# Patient Record
Sex: FEMALE | Race: WHITE | NOT HISPANIC OR LATINO | Employment: UNEMPLOYED | URBAN - NONMETROPOLITAN AREA
[De-identification: names, ages, dates, MRNs, and addresses within clinical notes are randomized per-mention and may not be internally consistent; named-entity substitution may affect disease eponyms.]

---

## 2017-03-17 ENCOUNTER — HOSPITAL ENCOUNTER (OUTPATIENT)
Dept: LAB | Facility: MEDICAL CENTER | Age: 42
End: 2017-03-17
Attending: PHYSICIAN ASSISTANT
Payer: COMMERCIAL

## 2017-03-17 LAB
T4 FREE SERPL-MCNC: 1.06 NG/DL (ref 0.53–1.43)
TSH SERPL DL<=0.005 MIU/L-ACNC: 1.11 UIU/ML (ref 0.3–3.7)

## 2017-03-17 PROCEDURE — 84439 ASSAY OF FREE THYROXINE: CPT

## 2017-03-17 PROCEDURE — 36415 COLL VENOUS BLD VENIPUNCTURE: CPT

## 2017-03-17 PROCEDURE — 84443 ASSAY THYROID STIM HORMONE: CPT

## 2017-04-18 ENCOUNTER — OFFICE VISIT (OUTPATIENT)
Dept: ENDOCRINOLOGY | Facility: MEDICAL CENTER | Age: 42
End: 2017-04-18
Payer: COMMERCIAL

## 2017-04-18 VITALS
WEIGHT: 210 LBS | HEART RATE: 87 BPM | BODY MASS INDEX: 38.4 KG/M2 | SYSTOLIC BLOOD PRESSURE: 132 MMHG | DIASTOLIC BLOOD PRESSURE: 84 MMHG | OXYGEN SATURATION: 95 %

## 2017-04-18 DIAGNOSIS — E03.9 ACQUIRED HYPOTHYROIDISM: ICD-10-CM

## 2017-04-18 PROCEDURE — 99203 OFFICE O/P NEW LOW 30 MIN: CPT | Performed by: INTERNAL MEDICINE

## 2017-04-18 NOTE — PROGRESS NOTES
New Patient Consult Note  Primary care physician: Shawn Joshua D.O.    Reason for consult: Hypothyroidism    HPI:  Lisa Bonilla is a 41 y.o. old patient who comes in today for evaluation of hypothyroidism. She was diagnosed with hypothyroidism over 5 years ago. She is currently on levofloxacin 75 mg daily. She complains of palpitations, especially waking up in the morning. Also has difficulty losing weight. She feels tired. No family history of thyroid issues.    ROS:  Constitutional: Positive for weight gain  Cardiac: Positive for palpitations  All other systems were reviewed and were negative.    Past Medical History:  Patient Active Problem List    Diagnosis Date Noted   • Daytime sleepiness 09/13/2016       Past Surgical History:  Past Surgical History   Procedure Laterality Date   • Carpal tunnel release         Allergies:  Pertussin    Social History:  Social History     Social History   • Marital Status:      Spouse Name: N/A   • Number of Children: N/A   • Years of Education: N/A     Occupational History   • Not on file.     Social History Main Topics   • Smoking status: Former Smoker -- 0.25 packs/day for 9 years     Types: Cigarettes     Quit date: 01/01/1999   • Smokeless tobacco: Never Used      Comment: Social smoker only   • Alcohol Use: 0.0 - 1.2 oz/week     0-1 Glasses of wine, 0-1 Cans of beer per week      Comment: Occasionally   • Drug Use: No   • Sexual Activity: Not on file     Other Topics Concern   • Not on file     Social History Narrative       Family History:  Family History   Problem Relation Age of Onset   • Diabetes Mother    • Sleep Apnea Father    • Diabetes Father    • Heart Disease Father    • Sleep Apnea Brother    • Heart Disease Maternal Grandmother    • Heart Disease Maternal Grandfather    • Heart Disease Paternal Grandmother    • Heart Disease Paternal Grandfather        Medications:    Current outpatient prescriptions:   •  levothyroxine (SYNTHROID) 75 MCG Tab,  Take 75 mcg by mouth every day. 1 tab po daily x 5 days each week, Disp: , Rfl: 0  •  Multiple Vitamin (MULTI VITAMIN DAILY) Tab, Take  by mouth., Disp: , Rfl:   •  vitamin D (CHOLECALCIFEROL) 1000 UNIT Tab, Take 1,000 Units by mouth every day., Disp: , Rfl:   •  Magnesium 500 MG Cap, Take  by mouth., Disp: , Rfl:   •  B Complex-Folic Acid (B COMPLEX FORMULA 1) Tab, Take  by mouth., Disp: , Rfl:   •  Omega-3 Fatty Acids (FISH OIL) 1000 MG Cap capsule, Take 1,000 mg by mouth 3 times a day, with meals., Disp: , Rfl:   •  zolpidem (AMBIEN) 5 MG Tab, Take 1-2 tablets by mouth every evening as needed for insomnia. Bring to sleep study., Disp: 3 Tab, Rfl: 0    Labs:  Results for GERRI PIERRE (MRN [id number]) as of 4/18/2017 12:52   Ref. Range 7/27/2016 09:00 3/17/2017 15:23   TSH Latest Ref Range: 0.300-3.700 uIU/mL 0.810 1.110   Free T-4 Latest Ref Range: 0.53-1.43 ng/dL 1.28 1.06     Physical Examination:  Vital signs: /84 mmHg  General: No apparent distress, cooperative  Eyes: No scleral icterus or discharge  ENMT: Normal on external inspection of nose, lips  Neck: No abnormal masses on inspection  Resp: Normal effort  Neuro: Alert and oriented  Skin: No rash  Psych: Normal mood and affect    Assessment and Plan:    1. Acquired hypothyroidism  · We discussed pituitary thyroid axis and its regulation, we discussed about diagnosis and treatment of hypothyroidism  · She has some symptoms such as palpitations and inability to lose weight which she thinks are related to her thyroid, her most recent TSH is 1.1, I tried to reassure her that she is adequately replaced with levothyroxine and the TSH is completely normal that her symptoms are not related to the thyroid; we will repeat labs in 6 weeks, if she continues to feel poorly we will try [place] Thyroid  - TSH; Future  - FREE THYROXINE; Future  - TSH; Future  - FREE THYROXINE; Future    Return if symptoms worsen or fail to improve.    Thank you for allowing me  to participate in the care of this patient.    Radha Thomas M.D.  04/18/2017    CC:   Shawn Joshua D.O.    This note was created using voice recognition software (Dragon). The accuracy of the dictation is limited by the abilities of the software. I have reviewed the note prior to signing, however some errors in grammar and context are still possible. If you have any questions related to this note please do not hesitate to contact our office.

## 2017-04-18 NOTE — MR AVS SNAPSHOT
Lisa Bonilla   2017 12:00 PM   Office Visit   MRN: [id number]    Department:  Endocrinology [facility]   Dept Phone:  [phone]    Description:  Female : 1975   Provider:  Radha Thomas M.D.           Reason for Visit     New Patient Thyroid      Allergies as of 2017     Allergen Noted Reactions    Pertussin [Cheracol-D Cough] 2016   Shortness of Breath    Pertussis Vaccine      You were diagnosed with     Acquired hypothyroidism   [3716541]         Vital Signs     Blood Pressure Pulse Weight Oxygen Saturation Smoking Status       132/84 mmHg 87 95.255 kg (210 lb) 95% Former Smoker       Basic Information     Date Of Birth Sex Race Ethnicity Preferred Language    1975 Female White Non- English      Problem List              ICD-10-CM Priority Class Noted - Resolved    Daytime sleepiness R40.0   2016 - Present      Health Maintenance        Date Due Completion Dates    IMM DTaP/Tdap/Td Vaccine (1 - Tdap) 1993 ---    PAP SMEAR 1995 ---    MAMMOGRAM 2014 ---            Current Immunizations     No immunizations on file.      Below and/or attached are the medications your provider expects you to take. Review all of your home medications and newly ordered medications with your provider and/or pharmacist. Follow medication instructions as directed by your provider and/or pharmacist. Please keep your medication list with you and share with your provider. Update the information when medications are discontinued, doses are changed, or new medications (including over-the-counter products) are added; and carry medication information at all times in the event of emergency situations     Allergies:  PERTUSSIN - Shortness of Breath               Medications  Valid as of: 2017 -  1:40 PM    Generic Name Brand Name Tablet Size Instructions for use    B Complex-Folic Acid (Tab) B COMPLEX FORMULA 1  Take  by mouth.        Cholecalciferol (Tab)  cholecalciferol 1000 UNIT Take 1,000 Units by mouth every day.        Levothyroxine Sodium (Tab) SYNTHROID 75 MCG Take 75 mcg by mouth every day. 1 tab po daily x 5 days each week        Magnesium Oxide (Cap) Magnesium 500 MG Take  by mouth.        Multiple Vitamin (Tab) MULTI VITAMIN DAILY  Take  by mouth.        Omega-3 Fatty Acids (Cap) fish oil 1000 MG Take 1,000 mg by mouth 3 times a day, with meals.        Zolpidem Tartrate (Tab) AMBIEN 5 MG Take 1-2 tablets by mouth every evening as needed for insomnia. Bring to sleep study.        .                 Medicines prescribed today were sent to:     [id number] - [place], [place] [address]    [address] [place] [address]    Phone: [phone] Fax: [phone]    Open 24 Hours?: No      Medication refill instructions:       If your prescription bottle indicates you have medication refills left, it is not necessary to call your provider’s office. Please contact your pharmacy and they will refill your medication.    If your prescription bottle indicates you do not have any refills left, you may request refills at any time through one of the following ways: The online Hobzy system (except Urgent Care), by calling your provider’s office, or by asking your pharmacy to contact your provider’s office with a refill request. Medication refills are processed only during regular business hours and may not be available until the next business day. Your provider may request additional information or to have a follow-up visit with you prior to refilling your medication.   *Please Note: Medication refills are assigned a new Rx number when refilled electronically. Your pharmacy may indicate that no refills were authorized even though a new prescription for the same medication is available at the pharmacy. Please request the medicine by name with the pharmacy before contacting your provider for a refill.        Your To Do List     Future Labs/Procedures Complete  By Expires    FREE THYROXINE  As directed 4/18/2018    FREE THYROXINE  As directed 4/18/2018    TSH  As directed 4/18/2018    TSH  As directed 4/18/2018         Citiz[employer]or Access Code: [email or web address]  Expires: 5/18/2017  1:40 PM    [employer]x  A secure, online tool to manage your health information     [facility]’s Citizinvestor® is a secure, online tool that connects you to your personalized health information from the privacy of your home -- day or night - making it very easy for you to manage your healthcare. Once the activation process is completed, you can even access your medical information using the Citizinvestor love, which is available for free in the Apple Love store or Google Play store.     Citiz[employer]or provides the following levels of access (as shown below):   My Chart Features   [facility] Doctor [facility]  Specialists [facility]  Urgent  Care Non-[facility]  Primary Care  Doctor   Email your healthcare team securely and privately 24/7 X X X    Manage appointments: schedule your next appointment; view details of past/upcoming appointments X      Request prescription refills. X      View recent personal medical records, including lab and immunizations X X X X   View health record, including health history, allergies, medications X X X X   Read reports about your outpatient visits, procedures, consult and ER notes X X X X   See your discharge summary, which is a recap of your hospital and/or ER visit that includes your diagnosis, lab results, and care plan. X X       How to register for Citizinvestor:  1. Go to  [email or web address].  2. Click on the Sign Up Now box, which takes you to the New Member Sign Up page. You will need to provide the following information:  a. Enter your Citizinvestor Access Code exactly as it appears at the top of this page. (You will not need to use this code after you’ve completed the sign-up process. If you do not sign up before the expiration date, you must request a new code.)   b. Enter your date  of birth.   c. Enter your home email address.   d. Click Submit, and follow the next screen’s instructions.  3. Create a Solar Roadways ID. This will be your Solar Roadways login ID and cannot be changed, so think of one that is secure and easy to remember.  4. Create a [place] password. You can change your password at any time.  5. Enter your Password Reset Question and Answer. This can be used at a later time if you forget your password.   6. Enter your e-mail address. This allows you to receive e-mail notifications when new information is available in Solar Roadways.  7. Click Sign Up. You can now view your health information.    For assistance activating your [employer] account, call [phone]

## 2018-05-31 ENCOUNTER — OFFICE VISIT (OUTPATIENT)
Dept: MEDICAL GROUP | Facility: MEDICAL CENTER | Age: 43
End: 2018-05-31
Payer: COMMERCIAL

## 2018-05-31 VITALS
DIASTOLIC BLOOD PRESSURE: 84 MMHG | HEIGHT: 63 IN | BODY MASS INDEX: 37.58 KG/M2 | TEMPERATURE: 100 F | SYSTOLIC BLOOD PRESSURE: 112 MMHG | OXYGEN SATURATION: 97 % | HEART RATE: 85 BPM | RESPIRATION RATE: 16 BRPM | WEIGHT: 212.08 LBS

## 2018-05-31 DIAGNOSIS — Z13.6 SCREENING FOR ISCHEMIC HEART DISEASE: ICD-10-CM

## 2018-05-31 DIAGNOSIS — E03.9 HYPOTHYROIDISM, UNSPECIFIED TYPE: ICD-10-CM

## 2018-05-31 DIAGNOSIS — R00.2 PALPITATION: ICD-10-CM

## 2018-05-31 DIAGNOSIS — Z13.1 SCREENING FOR DIABETES MELLITUS: ICD-10-CM

## 2018-05-31 PROBLEM — G47.00 INSOMNIA: Status: RESOLVED | Noted: 2018-05-31 | Resolved: 2018-05-31

## 2018-05-31 PROBLEM — G47.00 INSOMNIA: Status: ACTIVE | Noted: 2018-05-31

## 2018-05-31 PROCEDURE — 99204 OFFICE O/P NEW MOD 45 MIN: CPT | Performed by: FAMILY MEDICINE

## 2018-05-31 RX ORDER — LEVOTHYROXINE SODIUM 0.07 MG/1
75 TABLET ORAL DAILY
Qty: 90 TAB | Refills: 0 | Status: SHIPPED | OUTPATIENT
Start: 2018-05-31 | End: 2018-08-09 | Stop reason: SDUPTHER

## 2018-05-31 NOTE — PROGRESS NOTES
[facility]  Progress Note  New Patient    Subjective:   Lisa Bonilla is a 43 y.o. female here today with a chief complaint of palpitations. This is a new patient to me. The patient comes in alone.     Palpitation  The patient states that over a year ago she started waking up in the middle the night with sweating, palpitations and feeling hot. Ultimately, this went away. Over the past few months, however, she has noticed it occurring again and is moderate in intensity. She denies chest pain but does endorse some occasional shortness of breath. The patient states that her periods are less consistent than usual, though they are still occurring. There are no known alleviating or exacerbating factors.     Hypothyroidism  The patient has hypothyroidism and is taking Synthroid 75 mcg daily. She tolerates this medication well.       Current Outpatient Prescriptions on File Prior to Visit   Medication Sig Dispense Refill   • Multiple Vitamin (MULTI VITAMIN DAILY) Tab Take  by mouth.     • vitamin D (CHOLECALCIFEROL) 1000 UNIT Tab Take 1,000 Units by mouth every day.     • Magnesium 500 MG Cap Take  by mouth.     • B Complex-Folic Acid (B COMPLEX FORMULA 1) Tab Take  by mouth.     • Omega-3 Fatty Acids (FISH OIL) 1000 MG Cap capsule Take 1,000 mg by mouth 3 times a day, with meals.       No current facility-administered medications on file prior to visit.        Past Medical History:   Diagnosis Date   • Depression    • Hypertension    • Hypothyroidism        Allergies: Pertussis vaccine    Surgical History:  has a past surgical history that includes carpal tunnel release.    Family History: family history includes Diabetes in her father and mother; Heart Disease in her father, maternal grandfather, maternal grandmother, paternal grandfather, and paternal grandmother; Hyperlipidemia in her father and mother; Hypertension in her father; Psychiatry in her father and mother; Sleep Apnea in her brother and  "father.    Social History:  reports that she quit smoking about 19 years ago. Her smoking use included Cigarettes. She has a 2.25 pack-year smoking history. She has never used smokeless tobacco. She reports that she drinks alcohol. She reports that she does not use drugs.    ROS:   Constitutional ROS: no fevers, chills.   Eye ROS: No eye pain, redness, discharge  Ear ROS: No ear pain  Mouth/Throat ROS: No bleeding gums  Neck ROS: some occasional glandular swelling in neck.   Pulmonary ROS: No shortness of breath during day, sometimes at night.   Cardiovascular ROS: No chest pain  Gastrointestinal ROS: No abdominal pain  Musculoskeletal/Extremities ROS: some foot pain in morning.   Hematologic/Lymphatic ROS: No abnormal bleeding  Skin/Integumentary ROS: No evidence of rash  Neurologic ROS: No seizures  Psychiatric ROS: has h/o depression. No SI.        Objective:     Vitals:    05/31/18 1059   BP: 112/84   Pulse: 85   Resp: 16   Temp: 37.8 °C (100 °F)   SpO2: 97%   Weight: 96.2 kg (212 lb 1.3 oz)   Height: 1.6 m (5' 3\")       Physical Exam:  Constitutional: Alert, no distress.  Skin: Warm, dry, good turgor, no rashes in visible areas.  Eye: Equal, conjunctiva clear, lids normal.  ENMT: Lips without lesions, good dentition, oropharynx clear.  Neck: Trachea midline, no masses, no thyromegaly. No cervical or supraclavicular lymphadenopathy  Respiratory: Unlabored respiratory effort, lungs clear to auscultation, no wheezes, no ronchi.  Cardiovascular: Normal S1, S2, no murmur, no edema.  Abdomen: Soft, non-tender, no masses, no hepatosplenomegaly.  Psych: Alert and oriented, normal affect and mood.        Assessment and Plan:     1. Screening for ischemic heart disease  - LIPID PROFILE; Future    2. Screening for diabetes mellitus  - TSH; Future    3. Hypothyroidism, unspecified type  - continue Synthroid, obtain TSH.     4. Palpitation  Exam and vitals normal. With subjective early morning wakening and metrorhaggia. " Also on Synthroid. Will r/o arrhythmia and over-repletion of thyroid, though likely all these sx are due to perimenopause and we can then consider treatment options.   - COMP METABOLIC PANEL; Future  - TSH.   - HOLTER - Cardiology Performed (48HR); Future        Followup: Return in about 2 months (around 7/31/2018), or if symptoms worsen or fail to improve.

## 2018-05-31 NOTE — ASSESSMENT & PLAN NOTE
The patient has hypothyroidism and is taking Synthroid 75 mcg daily. She tolerates this medication well.

## 2018-06-22 ENCOUNTER — NON-PROVIDER VISIT (OUTPATIENT)
Dept: CARDIOLOGY | Facility: MEDICAL CENTER | Age: 43
End: 2018-06-22
Payer: COMMERCIAL

## 2018-06-22 DIAGNOSIS — R00.2 PALPITATION: ICD-10-CM

## 2018-06-27 LAB — EKG IMPRESSION: NORMAL

## 2018-06-27 PROCEDURE — 93224 XTRNL ECG REC UP TO 48 HRS: CPT | Performed by: INTERNAL MEDICINE

## 2018-07-17 ENCOUNTER — HOSPITAL ENCOUNTER (OUTPATIENT)
Dept: LAB | Facility: MEDICAL CENTER | Age: 43
End: 2018-07-17
Attending: FAMILY MEDICINE
Payer: COMMERCIAL

## 2018-07-17 DIAGNOSIS — R00.2 PALPITATION: ICD-10-CM

## 2018-07-17 DIAGNOSIS — Z13.1 SCREENING FOR DIABETES MELLITUS: ICD-10-CM

## 2018-07-17 DIAGNOSIS — Z13.6 SCREENING FOR ISCHEMIC HEART DISEASE: ICD-10-CM

## 2018-07-17 LAB — TSH SERPL DL<=0.005 MIU/L-ACNC: 0.63 UIU/ML (ref 0.38–5.33)

## 2018-07-17 PROCEDURE — 84443 ASSAY THYROID STIM HORMONE: CPT

## 2018-07-17 PROCEDURE — 80053 COMPREHEN METABOLIC PANEL: CPT

## 2018-07-17 PROCEDURE — 36415 COLL VENOUS BLD VENIPUNCTURE: CPT

## 2018-07-17 PROCEDURE — 80061 LIPID PANEL: CPT

## 2018-07-18 LAB
ALBUMIN SERPL BCP-MCNC: 4 G/DL (ref 3.2–4.9)
ALBUMIN/GLOB SERPL: 1.4 G/DL
ALP SERPL-CCNC: 74 U/L (ref 30–99)
ALT SERPL-CCNC: 16 U/L (ref 2–50)
ANION GAP SERPL CALC-SCNC: 5 MMOL/L (ref 0–11.9)
AST SERPL-CCNC: 14 U/L (ref 12–45)
BILIRUB SERPL-MCNC: 0.5 MG/DL (ref 0.1–1.5)
BUN SERPL-MCNC: 13 MG/DL (ref 8–22)
CALCIUM SERPL-MCNC: 9.4 MG/DL (ref 8.5–10.5)
CHLORIDE SERPL-SCNC: 105 MMOL/L (ref 96–112)
CHOLEST SERPL-MCNC: 148 MG/DL (ref 100–199)
CO2 SERPL-SCNC: 27 MMOL/L (ref 20–33)
CREAT SERPL-MCNC: 0.81 MG/DL (ref 0.5–1.4)
GLOBULIN SER CALC-MCNC: 2.9 G/DL (ref 1.9–3.5)
GLUCOSE SERPL-MCNC: 123 MG/DL (ref 65–99)
HDLC SERPL-MCNC: 33 MG/DL
LDLC SERPL CALC-MCNC: 102 MG/DL
POTASSIUM SERPL-SCNC: 4.3 MMOL/L (ref 3.6–5.5)
PROT SERPL-MCNC: 6.9 G/DL (ref 6–8.2)
SODIUM SERPL-SCNC: 137 MMOL/L (ref 135–145)
TRIGL SERPL-MCNC: 66 MG/DL (ref 0–149)

## 2018-08-09 ENCOUNTER — OFFICE VISIT (OUTPATIENT)
Dept: MEDICAL GROUP | Facility: MEDICAL CENTER | Age: 43
End: 2018-08-09
Payer: COMMERCIAL

## 2018-08-09 ENCOUNTER — HOSPITAL ENCOUNTER (OUTPATIENT)
Dept: RADIOLOGY | Facility: MEDICAL CENTER | Age: 43
End: 2018-08-09
Attending: FAMILY MEDICINE
Payer: COMMERCIAL

## 2018-08-09 VITALS
SYSTOLIC BLOOD PRESSURE: 128 MMHG | BODY MASS INDEX: 38.2 KG/M2 | DIASTOLIC BLOOD PRESSURE: 88 MMHG | RESPIRATION RATE: 20 BRPM | WEIGHT: 215.61 LBS | TEMPERATURE: 97.5 F | HEIGHT: 63 IN | HEART RATE: 80 BPM | OXYGEN SATURATION: 97 %

## 2018-08-09 DIAGNOSIS — M25.572 CHRONIC PAIN OF LEFT ANKLE: ICD-10-CM

## 2018-08-09 DIAGNOSIS — F32.1 CURRENT MODERATE EPISODE OF MAJOR DEPRESSIVE DISORDER WITHOUT PRIOR EPISODE (HCC): ICD-10-CM

## 2018-08-09 DIAGNOSIS — E03.9 HYPOTHYROIDISM, UNSPECIFIED TYPE: ICD-10-CM

## 2018-08-09 DIAGNOSIS — N95.1 PERIMENOPAUSE: ICD-10-CM

## 2018-08-09 DIAGNOSIS — G89.29 CHRONIC PAIN OF LEFT ANKLE: ICD-10-CM

## 2018-08-09 DIAGNOSIS — E78.5 DYSLIPIDEMIA: ICD-10-CM

## 2018-08-09 DIAGNOSIS — Z23 NEED FOR VACCINATION: ICD-10-CM

## 2018-08-09 DIAGNOSIS — R73.01 IMPAIRED FASTING GLUCOSE: ICD-10-CM

## 2018-08-09 DIAGNOSIS — G47.00 INSOMNIA, UNSPECIFIED TYPE: ICD-10-CM

## 2018-08-09 PROBLEM — F32.9 MAJOR DEPRESSIVE DISORDER: Status: ACTIVE | Noted: 2018-08-09

## 2018-08-09 PROCEDURE — 73610 X-RAY EXAM OF ANKLE: CPT | Mod: LT

## 2018-08-09 PROCEDURE — 99214 OFFICE O/P EST MOD 30 MIN: CPT | Performed by: FAMILY MEDICINE

## 2018-08-09 PROCEDURE — 73630 X-RAY EXAM OF FOOT: CPT | Mod: LT

## 2018-08-09 RX ORDER — HYDROXYZINE HYDROCHLORIDE 25 MG/1
25 TABLET, FILM COATED ORAL NIGHTLY PRN
Qty: 30 TAB | Refills: 0 | Status: SHIPPED | OUTPATIENT
Start: 2018-08-09 | End: 2018-09-04

## 2018-08-09 ASSESSMENT — PATIENT HEALTH QUESTIONNAIRE - PHQ9
SUM OF ALL RESPONSES TO PHQ QUESTIONS 1-9: 14
5. POOR APPETITE OR OVEREATING: 0 - NOT AT ALL
CLINICAL INTERPRETATION OF PHQ2 SCORE: 4

## 2018-08-09 NOTE — ASSESSMENT & PLAN NOTE
At the last visit the patient described sweating, palpitations and feeling hot without chest pain but does endorse some occasional shortness of breath and palpitations. Holter monitor is, overall, reassuring. Labs are reassuring. Her palpitations are improved.

## 2018-08-09 NOTE — ASSESSMENT & PLAN NOTE
Patient describes a history of depression with recent worsening due to some life stressors so stated with finding a job and family illness. Patient also describes some anxiety. This makes it difficult for her to stay asleep.

## 2018-08-09 NOTE — LETTER
[facility]  Huang Marshall M.D.  [address]  [place] [address]  Fax: [phone]   Authorization for Release/Disclosure of   Protected Health Information   Name: LISA PIERRE : 1975 SSN: [id number]   Address:  [place] [address] Phone:    [phone] (home)    I authorize the entity listed below to release/disclose the PHI below to:   [facility]/Huang Marshall M.D. and Huang Marshall M.D.   Provider or Entity Name:     Address   City, State, Zip   Phone:      Fax:     Reason for request: continuity of care   Information to be released:    [  ] LAST COLONOSCOPY,  including any PATH REPORT and follow-up  [  ] LAST FIT/COLOGUARD RESULT [  ] LAST DEXA  [  ] LAST MAMMOGRAM  [  ] LAST PAP  [  ] LAST LABS [  ] RETINA EXAM REPORT  [  ] IMMUNIZATION RECORDS  [  ] Release all info      [  ] Check here and initial the line next to each item to release ALL health information INCLUDING  _____ Care and treatment for drug and / or alcohol abuse  _____ HIV testing, infection status, or AIDS  _____ Genetic Testing    DATES OF SERVICE OR TIME PERIOD TO BE DISCLOSED: _____________  I understand and acknowledge that:  * This Authorization may be revoked at any time by you in writing, except if your health information has already been used or disclosed.  * Your health information that will be used or disclosed as a result of you signing this authorization could be re-disclosed by the recipient. If this occurs, your re-disclosed health information may no longer be protected by State or Federal laws.  * You may refuse to sign this Authorization. Your refusal will not affect your ability to obtain treatment.  * This Authorization becomes effective upon signing and will  on (date) __________.      If no date is indicated, this Authorization will  one (1) year from the signature date.    Name: Lisa Pierre    Signature:   Date:     2018            PLEASE FAX REQUESTED RECORDS BACK TO: [phone]

## 2018-08-09 NOTE — PROGRESS NOTES
[facility]  Progress Note  Established Patient    Subjective:   Lisa Bonilla is a 43 y.o. female here today with a chief complaint of ankle pain. The patient is alone.     Chronic pain of left ankle  A few months ago the patient twisted her ankle, since then she has had swelling and mild severity pain worse with certain activities. This pain is intermittent. There is some associated swelling.    Dyslipidemia  Noted on past labs.     Hypothyroidism  Well-controlled on Synthroid.    Impaired fasting glucose  Noted on past labs.     Insomnia  See discussion regarding depression.     Major depressive disorder  Patient describes a history of depression with recent worsening due to some life stressors so stated with finding a job and family illness. Patient also describes some anxiety. This makes it difficult for her to stay asleep.    Perimenopause  At the last visit the patient described sweating, palpitations and feeling hot without chest pain but does endorse some occasional shortness of breath and palpitations. Holter monitor is, overall, reassuring. Labs are reassuring. Her palpitations are improved.      Current Outpatient Prescriptions on File Prior to Visit   Medication Sig Dispense Refill   • levothyroxine (SYNTHROID) 75 MCG Tab Take 1 Tab by mouth every day. 90 Tab 0   • Multiple Vitamin (MULTI VITAMIN DAILY) Tab Take  by mouth.     • vitamin D (CHOLECALCIFEROL) 1000 UNIT Tab Take 1,000 Units by mouth every day.     • Magnesium 500 MG Cap Take  by mouth.     • B Complex-Folic Acid (B COMPLEX FORMULA 1) Tab Take  by mouth.     • Omega-3 Fatty Acids (FISH OIL) 1000 MG Cap capsule Take 1,000 mg by mouth 3 times a day, with meals.       No current facility-administered medications on file prior to visit.        Past Medical History:   Diagnosis Date   • Depression    • Hypertension    • Hypothyroidism        Allergies: Pertussis vaccine    Surgical History:  has a past surgical history that  "includes carpal tunnel release.    Family History: family history includes Diabetes in her father and mother; Heart Disease in her father, maternal grandfather, maternal grandmother, paternal grandfather, and paternal grandmother; Hyperlipidemia in her father and mother; Hypertension in her father; Psychiatry in her father and mother; Sleep Apnea in her brother and father.    Social History:  reports that she quit smoking about 19 years ago. Her smoking use included Cigarettes. She has a 2.25 pack-year smoking history. She has never used smokeless tobacco. She reports that she drinks alcohol. She reports that she does not use drugs.    ROS: no fever or nausea. No SI.       Objective:     Vitals:    08/09/18 1103   BP: 128/88   Pulse: 80   Resp: 20   Temp: 36.4 °C (97.5 °F)   SpO2: 97%   Weight: 97.8 kg (215 lb 9.8 oz)   Height: 1.6 m (5' 3\")       Physical Exam:  General: alert in no apparent distress.   Affect: depressed. Mood: anxious and depressed.   MSK: No tenderness to palpation over the left ankle bilateral malleoli, navicular bone or fifth metatarsal. 2+ DP pulse on the left. Sensation intact in the left lower extremity.        Assessment and Plan:       1. Dyslipidemia  - discussed diet and exercise.     2. Insomnia, unspecified type  Likely related to depression and anxiety.   - discussed sleep hygiene, handout given.   - hydrOXYzine HCl (ATARAX) 25 MG Tab; Take 1 Tab by mouth at bedtime as needed for Anxiety (and sleep).  Dispense: 30 Tab; Refill: 0    3. Hypothyroidism, unspecified type  This is an established and stable problem.  - continue synthroid.     4. Perimenopause  - patient will consider SSRI.     5. Chronic pain of left ankle  - Aircast provided.   - DX-ANKLE 3+ VIEWS LEFT; Future  - DX-FOOT-COMPLETE 3+ LEFT; Future    6. Current moderate episode of major depressive disorder without prior episode (HCC)  Patient would like to try hydroxyzine first then may consider SSRI. Declines counseling. "     7. Impaired fasting glucose  - discussed diet and exercise.         Followup: Return in about 3 weeks (around 8/30/2018), or if symptoms worsen or fail to improve.

## 2018-08-09 NOTE — ASSESSMENT & PLAN NOTE
A few months ago the patient twisted her ankle, since then she has had swelling and mild severity pain worse with certain activities. This pain is intermittent. There is some associated swelling.

## 2018-09-04 ENCOUNTER — OFFICE VISIT (OUTPATIENT)
Dept: MEDICAL GROUP | Facility: MEDICAL CENTER | Age: 43
End: 2018-09-04
Payer: COMMERCIAL

## 2018-09-04 VITALS
DIASTOLIC BLOOD PRESSURE: 82 MMHG | TEMPERATURE: 98.9 F | HEART RATE: 72 BPM | OXYGEN SATURATION: 97 % | WEIGHT: 222 LBS | HEIGHT: 63 IN | RESPIRATION RATE: 18 BRPM | BODY MASS INDEX: 39.34 KG/M2 | SYSTOLIC BLOOD PRESSURE: 128 MMHG

## 2018-09-04 DIAGNOSIS — N95.1 PERIMENOPAUSE: ICD-10-CM

## 2018-09-04 DIAGNOSIS — M25.572 CHRONIC PAIN OF LEFT ANKLE: ICD-10-CM

## 2018-09-04 DIAGNOSIS — F32.1 CURRENT MODERATE EPISODE OF MAJOR DEPRESSIVE DISORDER WITHOUT PRIOR EPISODE (HCC): ICD-10-CM

## 2018-09-04 DIAGNOSIS — G89.29 CHRONIC PAIN OF LEFT ANKLE: ICD-10-CM

## 2018-09-04 DIAGNOSIS — G47.00 INSOMNIA, UNSPECIFIED TYPE: ICD-10-CM

## 2018-09-04 PROCEDURE — 99214 OFFICE O/P EST MOD 30 MIN: CPT | Performed by: FAMILY MEDICINE

## 2018-09-04 RX ORDER — GABAPENTIN 100 MG/1
CAPSULE ORAL
Qty: 90 CAP | Refills: 0 | Status: SHIPPED | OUTPATIENT
Start: 2018-09-04 | End: 2018-10-16 | Stop reason: SDUPTHER

## 2018-09-04 NOTE — ASSESSMENT & PLAN NOTE
At the last visit the patient noted that a few months ago she twisted her ankle, since then she has had swelling and mild severity pain worse with certain activities. An x-ray was reassuring. The patient has been wearing her Aircast intermittently and this has resolved the pain. She still thinks there is still a little bit of swelling, however.

## 2018-09-04 NOTE — ASSESSMENT & PLAN NOTE
Patient continues to describe hot flashes and metrorrhagia. TSH is normal on her thyroid replacement.

## 2018-09-04 NOTE — ASSESSMENT & PLAN NOTE
At the last visit the patient described a history of depression with recent worsening due to some life stressors. This was associated with some anxiety and difficulty falling asleep. She wanted to avoid antidepressant medications and so I started her on nightly Vistaril. She states that this worked marvelously, improving her anxiety and her sleep and in doing so, improving her depression. Unfortunately, she had 7 pounds of weight gain on this medicine and so has not been able to continue. Of note, the patient also has some perimenopausal symptoms which she believes are contributing to her overall mood. She denies suicidal ideation.

## 2018-09-04 NOTE — PROGRESS NOTES
[facility]  Progress Note  Established Patient    Subjective:   Lisa Bonilla is a 43 y.o. female here today with a chief complaint of depression. The patient is alone.     Chronic pain of left ankle  At the last visit the patient noted that a few months ago she twisted her ankle, since then she has had swelling and mild severity pain worse with certain activities. An x-ray was reassuring. The patient has been wearing her Aircast intermittently and this has resolved the pain. She still thinks there is still a little bit of swelling, however.    Insomnia  See discussion regarding depression.     Major depressive disorder  At the last visit the patient described a history of depression with recent worsening due to some life stressors. This was associated with some anxiety and difficulty falling asleep. She wanted to avoid antidepressant medications and so I started her on nightly Vistaril. She states that this worked marvelously, improving her anxiety and her sleep and in doing so, improving her depression. Unfortunately, she had 7 pounds of weight gain on this medicine and so has not been able to continue. Of note, the patient also has some perimenopausal symptoms which she believes are contributing to her overall mood. She denies suicidal ideation.    Perimenopause  Patient continues to describe hot flashes and metrorrhagia. TSH is normal on her thyroid replacement.      Current Outpatient Prescriptions on File Prior to Visit   Medication Sig Dispense Refill   • levothyroxine (SYNTHROID) 75 MCG Tab Take 1 Tab by mouth Every morning on an empty stomach. 90 Tab 1   • Multiple Vitamin (MULTI VITAMIN DAILY) Tab Take  by mouth.     • vitamin D (CHOLECALCIFEROL) 1000 UNIT Tab Take 1,000 Units by mouth every day.     • Magnesium 500 MG Cap Take  by mouth.     • B Complex-Folic Acid (B COMPLEX FORMULA 1) Tab Take  by mouth.     • Omega-3 Fatty Acids (FISH OIL) 1000 MG Cap capsule Take 1,000 mg by mouth 3  "times a day, with meals.       No current facility-administered medications on file prior to visit.        Past Medical History:   Diagnosis Date   • Depression    • Hypertension    • Hypothyroidism        Allergies: Pertussis vaccine    Surgical History:  has a past surgical history that includes carpal tunnel release.    Family History: family history includes Diabetes in her father and mother; Heart Disease in her father, maternal grandfather, maternal grandmother, paternal grandfather, and paternal grandmother; Hyperlipidemia in her father and mother; Hypertension in her father; Psychiatry in her father and mother; Sleep Apnea in her brother and father.    Social History:  reports that she quit smoking about 19 years ago. Her smoking use included Cigarettes. She has a 2.25 pack-year smoking history. She has never used smokeless tobacco. She reports that she drinks alcohol. She reports that she does not use drugs.    ROS: no fever or nausea.        Objective:     Vitals:    09/04/18 1021   BP: 128/82   Pulse: 72   Resp: 18   Temp: 37.2 °C (98.9 °F)   SpO2: 97%   Weight: 100.7 kg (222 lb)   Height: 1.588 m (5' 2.5\")       Physical Exam:  Affect and mood: depressed.       Assessment and Plan:     1. Chronic pain of left ankle  - continue Aircast for 4 more weeks, will re-evaluate in f/u.     2. Current moderate episode of major depressive disorder without prior episode (HCC)  Patient would like to hold off on HRT and SSRI's for now. Will try gabapentin, which may help with depression, sleep, anxiety and perimenopause.  - gabapentin (NEURONTIN) 100 MG Cap; Take 1 PO hs. If inadequate response, increase to 2 PO hs.  Dispense: 90 Cap; Refill: 0    3. Perimenopause  - gabapentin (NEURONTIN) 100 MG Cap; Take 1 PO hs. If inadequate response, increase to 2 PO hs.  Dispense: 90 Cap; Refill: 0    4. Insomnia, unspecified type  - continue sleep hygiene.   - gabapentin (NEURONTIN) 100 MG Cap; Take 1 PO hs. If inadequate " response, increase to 2 PO hs.  Dispense: 90 Cap; Refill: 0        Followup: Return in about 4 weeks (around 10/2/2018), or if symptoms worsen or fail to improve.

## 2019-02-21 DIAGNOSIS — E03.9 HYPOTHYROIDISM, UNSPECIFIED TYPE: ICD-10-CM

## 2019-02-21 RX ORDER — LEVOTHYROXINE SODIUM 0.07 MG/1
75 TABLET ORAL
Qty: 90 TAB | Refills: 1 | Status: SHIPPED | OUTPATIENT
Start: 2019-02-21 | End: 2020-02-24 | Stop reason: SDUPTHER

## 2019-02-21 NOTE — TELEPHONE ENCOUNTER
Pt is in [facility], and unable to come to [place] right now due to being ill.  Hoped to get a partial thyroid rx sent to the pharmacy there.  Dr. Marshall, please sign the rx if ok.

## 2019-11-11 ENCOUNTER — OFFICE VISIT (OUTPATIENT)
Dept: MEDICAL GROUP | Facility: MEDICAL CENTER | Age: 44
End: 2019-11-11
Payer: COMMERCIAL

## 2019-11-11 VITALS
BODY MASS INDEX: 37.97 KG/M2 | HEIGHT: 63 IN | SYSTOLIC BLOOD PRESSURE: 138 MMHG | HEART RATE: 63 BPM | WEIGHT: 214.29 LBS | RESPIRATION RATE: 18 BRPM | OXYGEN SATURATION: 97 % | TEMPERATURE: 98.3 F | DIASTOLIC BLOOD PRESSURE: 84 MMHG

## 2019-11-11 DIAGNOSIS — E03.9 HYPOTHYROIDISM, UNSPECIFIED TYPE: ICD-10-CM

## 2019-11-11 DIAGNOSIS — G89.29 CHRONIC PAIN OF LEFT ANKLE: ICD-10-CM

## 2019-11-11 DIAGNOSIS — M25.572 CHRONIC PAIN OF LEFT ANKLE: ICD-10-CM

## 2019-11-11 DIAGNOSIS — Z23 NEED FOR VACCINATION: ICD-10-CM

## 2019-11-11 DIAGNOSIS — E78.5 DYSLIPIDEMIA: ICD-10-CM

## 2019-11-11 DIAGNOSIS — R07.9 CHEST PAIN, UNSPECIFIED TYPE: ICD-10-CM

## 2019-11-11 DIAGNOSIS — R73.01 IMPAIRED FASTING GLUCOSE: ICD-10-CM

## 2019-11-11 DIAGNOSIS — Z00.00 HEALTHCARE MAINTENANCE: ICD-10-CM

## 2019-11-11 PROCEDURE — 90471 IMMUNIZATION ADMIN: CPT | Performed by: FAMILY MEDICINE

## 2019-11-11 PROCEDURE — 99214 OFFICE O/P EST MOD 30 MIN: CPT | Mod: 25 | Performed by: FAMILY MEDICINE

## 2019-11-11 PROCEDURE — 99999 PR NO CHARGE: CPT | Performed by: FAMILY MEDICINE

## 2019-11-11 PROCEDURE — 90686 IIV4 VACC NO PRSV 0.5 ML IM: CPT | Performed by: FAMILY MEDICINE

## 2019-11-11 PROCEDURE — 93000 ELECTROCARDIOGRAM COMPLETE: CPT | Performed by: FAMILY MEDICINE

## 2019-11-11 RX ORDER — LORAZEPAM 0.5 MG/1
0.5 TABLET ORAL 2 TIMES DAILY PRN
Qty: 10 TAB | Refills: 0 | Status: SHIPPED | OUTPATIENT
Start: 2019-11-11 | End: 2019-11-21

## 2019-11-11 ASSESSMENT — PATIENT HEALTH QUESTIONNAIRE - PHQ9
5. POOR APPETITE OR OVEREATING: MORE THAN HALF THE DAYS
4. FEELING TIRED OR HAVING LITTLE ENERGY: MORE THAN HALF THE DAYS
1. LITTLE INTEREST OR PLEASURE IN DOING THINGS: MORE THAN HALF THE DAYS
SUM OF ALL RESPONSES TO PHQ QUESTIONS 1-9: 14
9. THOUGHTS THAT YOU WOULD BE BETTER OFF DEAD, OR OF HURTING YOURSELF: NOT AT ALL
2. FEELING DOWN, DEPRESSED, IRRITABLE, OR HOPELESS: MORE THAN HALF THE DAYS
3. TROUBLE FALLING OR STAYING ASLEEP OR SLEEPING TOO MUCH: MORE THAN HALF THE DAYS
7. TROUBLE CONCENTRATING ON THINGS, SUCH AS READING THE NEWSPAPER OR WATCHING TELEVISION: MORE THAN HALF THE DAYS
8. MOVING OR SPEAKING SO SLOWLY THAT OTHER PEOPLE COULD HAVE NOTICED. OR THE OPPOSITE, BEING SO FIGETY OR RESTLESS THAT YOU HAVE BEEN MOVING AROUND A LOT MORE THAN USUAL: NOT AT ALL
6. FEELING BAD ABOUT YOURSELF - OR THAT YOU ARE A FAILURE OR HAVE LET YOURSELF OR YOUR FAMILY DOWN: MORE THAN HALF THE DAYS
SUM OF ALL RESPONSES TO PHQ9 QUESTIONS 1 AND 2: 4

## 2019-11-11 NOTE — PROGRESS NOTES
[facility]  Progress Note  Established Patient    Subjective:   Lisa Bonilla is a 44 y.o. female here today with a chief complaint of chest pain. The patient is alone.     Chest pain  Patient states that on November 7 she presented to the [facility] emergency room for chest pain.  She states that they did labs, a chest x-ray and an EKG.  Everything was normal so they decided to send her home.  Patient tells me that the chest pain started a few days before she went to the ER.  She would describe waxing and waning pressure in the center of her chest.  There was no exertional component.  There was no associated shortness of breath, nausea or radiations of the pain to the neck or arm.  Patient states that she is currently asymptomatic but did have pain yesterday.  The pains are not associated with eating.  There is no epigastric pain.  There is no nausea.  She does describe some recent stress and wonders if this could be playing a role.  Her pain is moderate in severity and she has not tried anything to help.  She has used hydroxyzine in the past and did not tolerate it well.     Chronic pain of left ankle  This has resolved.    Dyslipidemia  Patient is a slight dyslipidemia.    Hypothyroidism  Patient's hypothyroidism is controlled with Synthroid.  She is requesting a TSH along with B12 and vitamin D testing.    Impaired fasting glucose  Noted on past labs.      Current Outpatient Medications on File Prior to Visit   Medication Sig Dispense Refill   • levothyroxine (SYNTHROID) 75 MCG Tab Take 1 Tab by mouth Every morning on an empty stomach. 90 Tab 1   • Multiple Vitamin (MULTI VITAMIN DAILY) Tab Take  by mouth.     • vitamin D (CHOLECALCIFEROL) 1000 UNIT Tab Take 1,000 Units by mouth every day.     • Magnesium 500 MG Cap Take  by mouth.     • B Complex-Folic Acid (B COMPLEX FORMULA 1) Tab Take  by mouth.     • Omega-3 Fatty Acids (FISH OIL) 1000 MG Cap capsule Take 1,000 mg by  "mouth 3 times a day, with meals.       No current facility-administered medications on file prior to visit.        Past Medical History:   Diagnosis Date   • Depression    • Hypertension    • Hypothyroidism        Allergies: Pertussis vaccine    Surgical History:  has a past surgical history that includes carpal tunnel release.    Family History: family history includes Diabetes in her father and mother; Heart Disease in her father, maternal grandfather, maternal grandmother, paternal grandfather, and paternal grandmother; Hyperlipidemia in her father and mother; Hypertension in her father; Psychiatric Illness in her father and mother; Sleep Apnea in her brother and father.    Social History:  reports that she quit smoking about 20 years ago. Her smoking use included cigarettes. She has a 2.25 pack-year smoking history. She has never used smokeless tobacco. She reports current alcohol use. She reports that she does not use drugs.    ROS: see HPI.        Objective:     Vitals:    11/11/19 0914   BP: 138/84   BP Location: Left arm   Patient Position: Sitting   BP Cuff Size: Large adult   Pulse: 63   Resp: 18   Temp: 36.8 °C (98.3 °F)   TempSrc: Temporal   SpO2: 97%   Weight: 97.2 kg (214 lb 4.6 oz)   Height: 1.588 m (5' 2.5\")       Physical Exam:  General: alert in no apparent distress.   Cardio: regular rate and rhythm, no murmurs, rubs or gallops.   Resp: CTAB no w/r/r.   Abd: no epigastric tenderness, negative Rangel's sign.   MSK: no TTP over chest wall by patient report.         Assessment and Plan:     1. Need for vaccination  - Influenza Vaccine Quad Injection (PF)    2. Chest pain, unspecified type  Vitals, EKG and exam normal. Suspect noncardiac, likely anxiety. Will send to stress test and do trial of ativan. The patient was counseled on appropriate use of Ativan and was advised to only take the medicine as prescribed. The patient was advised of the risk of sedation and the importance of not taking this " medicine with other sedating medicines or alcohol. The patient was counseled not to drive on this medicine.  checked and appropriate.   - LORazepam (ATIVAN) 0.5 MG Tab; Take 1 Tab by mouth 2 times a day as needed for Anxiety for up to 10 days.  Dispense: 10 Tab; Refill: 0  - Cardiac Stress Test Treadmill Only    3. Dyslipidemia  - Lipid Profile; Future  - VITAMIN D,25 HYDROXY; Future  - VITAMIN B12; Future    4. Hypothyroidism, unspecified type  - continue Synthroid.  - TSH; Future  - VITAMIN D,25 HYDROXY; Future  - VITAMIN B12; Future    5. Impaired fasting glucose  - Basic Metabolic Panel; Future  - VITAMIN D,25 HYDROXY; Future  - VITAMIN B12; Future    6. Healthcare maintenance  - flu shot given today.     7. Chronic pain of left ankle  - resolved.     Note: I will be in contact with the patient via MyChart in about 10 days to f/u. Will also need to arrange regular f/u for chronic issues and healthcare maint topics.     Followup: Return if symptoms worsen or fail to improve.

## 2019-11-11 NOTE — ASSESSMENT & PLAN NOTE
Patient's hypothyroidism is controlled with Synthroid.  She is requesting a TSH along with B12 and vitamin D testing.

## 2019-11-11 NOTE — ASSESSMENT & PLAN NOTE
Patient states that on November 7 she presented to the [facility] emergency room for chest pain.  She states that they did labs, a chest x-ray and an EKG.  Everything was normal so they decided to send her home.  Patient tells me that the chest pain started a few days before she went to the ER.  She would describe waxing and waning pressure in the center of her chest.  There was no exertional component.  There was no associated shortness of breath, nausea or radiations of the pain to the neck or arm.  Patient states that she is currently asymptomatic but did have pain yesterday.  The pains are not associated with eating.  There is no epigastric pain.  There is no nausea.  She does describe some recent stress and wonders if this could be playing a role.  Her pain is moderate in severity and she has not tried anything to help.  She has used hydroxyzine in the past and did not tolerate it well.

## 2019-11-20 ENCOUNTER — TELEPHONE (OUTPATIENT)
Dept: MEDICAL GROUP | Facility: MEDICAL CENTER | Age: 44
End: 2019-11-20

## 2019-11-20 NOTE — TELEPHONE ENCOUNTER
Please call and ask this patient to schedule stress test. Please arrange for a f/u appt in about 2 weeks, patient should complete stress test by then.

## 2019-11-21 NOTE — TELEPHONE ENCOUNTER
Pt read my mess via my chart and scheduled an lashae as follow:  Future Appointments       Provider Department Center    2/12/2020 9:45 AM [facility]         tanya Vargas...

## 2020-01-07 ENCOUNTER — TELEPHONE (OUTPATIENT)
Dept: MEDICAL GROUP | Facility: MEDICAL CENTER | Age: 45
End: 2020-01-07

## 2020-01-07 NOTE — TELEPHONE ENCOUNTER
Called [facility] to refax the stress test results. They are faxing it over again to [phone]

## 2020-01-07 NOTE — TELEPHONE ENCOUNTER
Regarding: FW: Message from DR. Marshall  Contact: [phone]      ----- Message -----  From: Huang Marshall M.D.  Sent: 12/30/2019  To: Huang Marshall M.D.  Subject: FW: Message from DR. Marshall                      F/u on this.   ----- Message -----  From: Olga Gautam, Med Ass't  Sent: 12/17/2019   3:45 PM PST  To: Huang Marshall M.D.  Subject: FW: Message from DR. Marshall                          ----- Message -----  From: Olga Gautam, Med Ass't  Sent: 12/16/2019   5:13 PM PST  To: Huang Marshall M.D., #  Subject: FW: Message from DR. Marshall                      Faxed a request to [facility]. Will keep an eye :)  ----- Message -----  From: Huang Marshall M.D.  Sent: 12/16/2019   1:50 PM PST  To: Olga Gautam, Med Ass't, Bonnie Ramires Ma  Subject: RE: Message from DR. Marshall                          ----- Message -----  From: Olga Gautam, Med Ass't  Sent: 12/16/2019   1:44 PM PST  To: Huang Marshall M.D.  Subject: RE: Message from DR. Marshall                          ----- Message -----  From: Olga Gautam, Med Ass't  Sent: 12/11/2019  12:49 PM PST  To: Huang Marshall M.D., #  Subject: RE: Message from DR. Marshall                      ----- Message from Olga Gautam, Med Ass't sent at 12/11/2019 12:49 PM PST -----       ----- Message sent from Olga Gautam, Med Ass't to Lisa Bonilla at 11/26/2019  5:03 PM -----   I will fax the orders to [facility]. Please give them a call tomorrow at [phone] and ask to speak with the scheduling department for radiology.  Let me know if you have any further questions.   Thank you,  Olga MIDDLETON      ----- Message -----     From: Lisa Bonilla     Sent: 11/26/2019  2:59 PM PST       To: Olga Gautam, Med Ass't  Subject: RE: Message from DR. Marshall    Yes it would be okay for you to send the order and have them contact me. I really appreciate your help!    Thank you so much.   Lisa     ----- Message -----  From: Olga  Gautam, Med Ass't  Sent: 11/25/19, 4:30 PM  To: Lisa Bonilla  Subject: RE: Message from DR. Rolando Gonzalez,  [facility] does not takes your insurance.   [facility] does and they have availability for next week however they need to know if your insurance requires a prior authorization for the test.   I contacted our radiology department and according to them your insurance does not.   Would it be okay that I send [facility] the order so that they can reach out to you for an earlier appointment?  Olga MIDDLETON      ----- Message -----     From: Lisa Bonilla     Sent: 11/22/2019 12:29 PM PST       To: Rosendo Galarza Ass't  Subject: RE: Message from DR. Marshall    Does the follow-up need to occur after the stress test or was it a follow-up related to the EKGs that have already been done? I want to be sure I understand before I make the appointment. I called to schedule the stress test and the earliest they could get me in was February 12th.     Thanks for your help.   Lisa    ----- Message -----  From: Rosendo Galarza Ass't  Sent: 11/20/19, 12:14 PM  To: Lisa Bonilla  Subject: Message from DR. Rolando Gonzalez,  Dr. Marshall asked that you contact cardiology at [phone] and schedule a stress test. He also asked that we schedule a follow up appointment with him so that he can go over the results of the heart test with you.  Let me know which day in the week works for you.  Thank you,  Olga MIDDLETON

## 2020-02-24 DIAGNOSIS — E03.9 HYPOTHYROIDISM, UNSPECIFIED TYPE: ICD-10-CM

## 2020-02-24 RX ORDER — LEVOTHYROXINE SODIUM 0.07 MG/1
75 TABLET ORAL
Qty: 90 TAB | Refills: 4 | Status: SHIPPED | OUTPATIENT
Start: 2020-02-24 | End: 2021-03-11

## 2021-03-11 DIAGNOSIS — E03.9 HYPOTHYROIDISM, UNSPECIFIED TYPE: ICD-10-CM

## 2021-03-11 RX ORDER — LEVOTHYROXINE SODIUM 0.07 MG/1
75 TABLET ORAL
Qty: 90 TABLET | Refills: 0 | Status: SHIPPED | OUTPATIENT
Start: 2021-03-11 | End: 2021-06-03

## 2021-06-02 DIAGNOSIS — E03.9 HYPOTHYROIDISM, UNSPECIFIED TYPE: ICD-10-CM

## 2021-06-03 RX ORDER — LEVOTHYROXINE SODIUM 0.07 MG/1
TABLET ORAL
Qty: 90 TABLET | Refills: 0 | Status: SHIPPED | OUTPATIENT
Start: 2021-06-03 | End: 2021-08-30

## 2021-08-30 ENCOUNTER — TELEPHONE (OUTPATIENT)
Dept: MEDICAL GROUP | Facility: MEDICAL CENTER | Age: 46
End: 2021-08-30

## 2021-08-31 NOTE — TELEPHONE ENCOUNTER
Scheduled pt as follow:  Future Appointments       Provider Department Center    9/8/2021 1:30 PM Huang Marshall M.D. [facility]        tanya Vargas...

## 2021-09-08 ENCOUNTER — OFFICE VISIT (OUTPATIENT)
Dept: MEDICAL GROUP | Facility: MEDICAL CENTER | Age: 46
End: 2021-09-08
Payer: COMMERCIAL

## 2021-09-08 VITALS
SYSTOLIC BLOOD PRESSURE: 128 MMHG | TEMPERATURE: 98.5 F | DIASTOLIC BLOOD PRESSURE: 80 MMHG | HEART RATE: 69 BPM | RESPIRATION RATE: 16 BRPM | OXYGEN SATURATION: 97 % | HEIGHT: 62 IN | BODY MASS INDEX: 37.54 KG/M2 | WEIGHT: 204 LBS

## 2021-09-08 DIAGNOSIS — E78.5 DYSLIPIDEMIA: ICD-10-CM

## 2021-09-08 DIAGNOSIS — E03.9 HYPOTHYROIDISM, UNSPECIFIED TYPE: ICD-10-CM

## 2021-09-08 DIAGNOSIS — Z00.00 HEALTHCARE MAINTENANCE: ICD-10-CM

## 2021-09-08 DIAGNOSIS — Z23 NEED FOR VACCINATION: ICD-10-CM

## 2021-09-08 DIAGNOSIS — Z12.11 COLON CANCER SCREENING: ICD-10-CM

## 2021-09-08 DIAGNOSIS — R73.01 IMPAIRED FASTING GLUCOSE: ICD-10-CM

## 2021-09-08 PROBLEM — G47.00 INSOMNIA: Status: RESOLVED | Noted: 2018-05-31 | Resolved: 2021-09-08

## 2021-09-08 PROBLEM — F32.9 MAJOR DEPRESSIVE DISORDER: Status: RESOLVED | Noted: 2018-08-09 | Resolved: 2021-09-08

## 2021-09-08 PROBLEM — R07.9 CHEST PAIN: Status: RESOLVED | Noted: 2019-11-11 | Resolved: 2021-09-08

## 2021-09-08 PROBLEM — N95.1 PERIMENOPAUSE: Status: RESOLVED | Noted: 2018-05-31 | Resolved: 2021-09-08

## 2021-09-08 PROCEDURE — 99214 OFFICE O/P EST MOD 30 MIN: CPT | Mod: 25 | Performed by: FAMILY MEDICINE

## 2021-09-08 PROCEDURE — 90471 IMMUNIZATION ADMIN: CPT | Performed by: FAMILY MEDICINE

## 2021-09-08 PROCEDURE — 90714 TD VACC NO PRESV 7 YRS+ IM: CPT | Performed by: FAMILY MEDICINE

## 2021-09-08 RX ORDER — PERPHENAZINE 2 MG
1 TABLET ORAL
COMMUNITY
End: 2023-04-19

## 2021-09-08 ASSESSMENT — PATIENT HEALTH QUESTIONNAIRE - PHQ9
4. FEELING TIRED OR HAVING LITTLE ENERGY: NOT AT ALL
3. TROUBLE FALLING OR STAYING ASLEEP OR SLEEPING TOO MUCH: NOT AT ALL
6. FEELING BAD ABOUT YOURSELF - OR THAT YOU ARE A FAILURE OR HAVE LET YOURSELF OR YOUR FAMILY DOWN: NOT AL ALL
1. LITTLE INTEREST OR PLEASURE IN DOING THINGS: NOT AT ALL
2. FEELING DOWN, DEPRESSED, IRRITABLE, OR HOPELESS: NOT AT ALL
SUM OF ALL RESPONSES TO PHQ9 QUESTIONS 1 AND 2: 0
5. POOR APPETITE OR OVEREATING: NOT AT ALL
8. MOVING OR SPEAKING SO SLOWLY THAT OTHER PEOPLE COULD HAVE NOTICED. OR THE OPPOSITE, BEING SO FIGETY OR RESTLESS THAT YOU HAVE BEEN MOVING AROUND A LOT MORE THAN USUAL: NOT AT ALL
9. THOUGHTS THAT YOU WOULD BE BETTER OFF DEAD, OR OF HURTING YOURSELF: NOT AT ALL
7. TROUBLE CONCENTRATING ON THINGS, SUCH AS READING THE NEWSPAPER OR WATCHING TELEVISION: NOT AT ALL
SUM OF ALL RESPONSES TO PHQ QUESTIONS 1-9: 0

## 2021-09-08 NOTE — PROGRESS NOTES
"[facility]  Progress Note  Established Patient    Subjective:   Lisa Bonilla is a 46 y.o. female here today with a chief complaint of low thyroid. The patient is alone.     Impaired fasting glucose  Noted on previous labs.     Hypothyroidism  Controlled with Synthroid.    Healthcare maintenance  Lipids: ordered.  Fasting Glucose: ordered.  Colonoscopy: recommended. Patient declines. Will order FIT.   Mammogram: ordered.   Pap: recommended, patient will return to get.     Tdap: patient allergic to pertussis component, will give Td.   COVID vaccine: given 2021.       Current Outpatient Medications on File Prior to Visit   Medication Sig Dispense Refill   • Multiple Vitamins-Minerals (LUTEIN-ZEAXANTHIN) Tab Take 1 Tablet by mouth every 48 hours.     • levothyroxine (SYNTHROID) 75 MCG Tab TAKE 1 TABLET BY MOUTH EVERY DAY IN THE MORNING ON AN EMPTY STOMACH 90 Tablet 0   • Multiple Vitamin (MULTI VITAMIN DAILY) Tab Take  by mouth.     • vitamin D (CHOLECALCIFEROL) 1000 UNIT Tab Take 1,000 Units by mouth every day.     • Magnesium 500 MG Cap Take  by mouth.     • B Complex-Folic Acid (B COMPLEX FORMULA 1) Tab Take  by mouth.     • Omega-3 Fatty Acids (FISH OIL) 1000 MG Cap capsule Take 1,000 mg by mouth 3 times a day, with meals.       No current facility-administered medications on file prior to visit.          Objective:     Vitals:    09/08/21 1345   BP: 128/80   BP Location: Left arm   Patient Position: Sitting   BP Cuff Size: Large adult   Pulse: 69   Resp: 16   Temp: 36.9 °C (98.5 °F)   TempSrc: Temporal   SpO2: 97%   Weight: 92.5 kg (204 lb)   Height: 1.575 m (5' 2\")       Physical Exam:  General: alert in no apparent distress.         Assessment and Plan:     1. Need for vaccination  - TD Vaccine Preservative Free =>6YO IM    2. Healthcare maintenance  - see HPI.   - Comp Metabolic Panel; Future  - Lipid Profile; Future  - MA-SCREENING MAMMO BILAT W/CAD; Future  - OCCULT BLOOD FECES IMMUNOASSAY; " Future    3. Hypothyroidism, unspecified type  - continue Synthroid.   - TSH; Future    4. Colon cancer screening  - OCCULT BLOOD FECES IMMUNOASSAY; Future    5. Impaired fasting glucose  - CMP.     6. Dyslipidemia  - Lipid panel.         Followup: Return in about 1 year (around 9/8/2022), or if symptoms worsen or fail to improve, for Wellness Visit, Long.

## 2021-09-08 NOTE — ASSESSMENT & PLAN NOTE
Lipids: ordered.  Fasting Glucose: ordered.  Colonoscopy: recommended. Patient declines. Will order FIT.   Mammogram: ordered.   Pap: recommended, patient will return to get.     Tdap: patient allergic to pertussis component, will give Td.   COVID vaccine: given 2021.

## 2021-09-27 ENCOUNTER — HOSPITAL ENCOUNTER (OUTPATIENT)
Facility: MEDICAL CENTER | Age: 46
End: 2021-09-27
Attending: FAMILY MEDICINE
Payer: COMMERCIAL

## 2021-09-27 PROCEDURE — 82274 ASSAY TEST FOR BLOOD FECAL: CPT

## 2021-10-05 DIAGNOSIS — Z00.00 HEALTHCARE MAINTENANCE: ICD-10-CM

## 2021-10-05 DIAGNOSIS — Z12.11 COLON CANCER SCREENING: ICD-10-CM

## 2021-10-07 LAB — IMM ASSAY OCC BLD FITOB: NEGATIVE

## 2022-01-28 ENCOUNTER — TELEPHONE (OUTPATIENT)
Dept: MEDICAL GROUP | Facility: MEDICAL CENTER | Age: 47
End: 2022-01-28

## 2022-01-28 DIAGNOSIS — E03.9 HYPOTHYROIDISM, UNSPECIFIED TYPE: ICD-10-CM

## 2022-01-28 RX ORDER — LEVOTHYROXINE SODIUM 0.07 MG/1
75 TABLET ORAL
Qty: 90 TABLET | Refills: 3 | Status: SHIPPED | OUTPATIENT
Start: 2022-01-28 | End: 2022-06-13 | Stop reason: SDUPTHER

## 2022-01-28 NOTE — TELEPHONE ENCOUNTER
Was the patient seen in the last year in this department? Yes   Does patient have an active prescription for medications requested? No   Received Request Via: Patient  Pt had labs with Carri love, Dr. Marshall, please see Media. Pt. Needs a refill on her thyroid rx however please order with dosage changes if needed based on her thyroid lab results.

## 2022-06-13 DIAGNOSIS — E03.9 HYPOTHYROIDISM, UNSPECIFIED TYPE: ICD-10-CM

## 2022-06-13 RX ORDER — LEVOTHYROXINE SODIUM 0.07 MG/1
75 TABLET ORAL
Qty: 90 TABLET | Refills: 1 | Status: SHIPPED | OUTPATIENT
Start: 2022-06-13 | End: 2022-12-27

## 2022-12-26 DIAGNOSIS — E03.9 HYPOTHYROIDISM, UNSPECIFIED TYPE: ICD-10-CM

## 2022-12-27 RX ORDER — LEVOTHYROXINE SODIUM 0.07 MG/1
TABLET ORAL
Qty: 15 TABLET | Refills: 0 | Status: SHIPPED | OUTPATIENT
Start: 2022-12-27 | End: 2023-05-05 | Stop reason: SDUPTHER

## 2022-12-27 NOTE — TELEPHONE ENCOUNTER
Was the patient seen in the last year in this department? No    Does patient have an active prescription for medications requested? No   Received Request Via: Pharmacy  Sent pt a mess via my chart to est care with new PCP

## 2023-04-19 ENCOUNTER — OFFICE VISIT (OUTPATIENT)
Dept: MEDICAL GROUP | Facility: PHYSICIAN GROUP | Age: 48
End: 2023-04-19
Payer: COMMERCIAL

## 2023-04-19 VITALS
HEART RATE: 70 BPM | OXYGEN SATURATION: 100 % | HEIGHT: 63 IN | WEIGHT: 215 LBS | SYSTOLIC BLOOD PRESSURE: 132 MMHG | BODY MASS INDEX: 38.09 KG/M2 | TEMPERATURE: 98.8 F | RESPIRATION RATE: 16 BRPM | DIASTOLIC BLOOD PRESSURE: 96 MMHG

## 2023-04-19 DIAGNOSIS — E03.9 HYPOTHYROIDISM, UNSPECIFIED TYPE: ICD-10-CM

## 2023-04-19 DIAGNOSIS — E78.5 DYSLIPIDEMIA: ICD-10-CM

## 2023-04-19 DIAGNOSIS — R06.83 SNORES: ICD-10-CM

## 2023-04-19 DIAGNOSIS — Z12.11 COLON CANCER SCREENING: ICD-10-CM

## 2023-04-19 DIAGNOSIS — R73.01 IMPAIRED FASTING GLUCOSE: ICD-10-CM

## 2023-04-19 DIAGNOSIS — R53.83 OTHER FATIGUE: ICD-10-CM

## 2023-04-19 PROBLEM — Z00.00 HEALTHCARE MAINTENANCE: Status: RESOLVED | Noted: 2019-11-11 | Resolved: 2023-04-19

## 2023-04-19 PROCEDURE — 99214 OFFICE O/P EST MOD 30 MIN: CPT | Performed by: PHYSICIAN ASSISTANT

## 2023-04-19 RX ORDER — CEPHALEXIN 500 MG/1
CAPSULE ORAL
COMMUNITY
Start: 2023-03-29 | End: 2023-04-19

## 2023-04-19 ASSESSMENT — PATIENT HEALTH QUESTIONNAIRE - PHQ9
SUM OF ALL RESPONSES TO PHQ QUESTIONS 1-9: 5
5. POOR APPETITE OR OVEREATING: 1 - SEVERAL DAYS
CLINICAL INTERPRETATION OF PHQ2 SCORE: 1

## 2023-04-19 ASSESSMENT — ENCOUNTER SYMPTOMS
FEVER: 0
SHORTNESS OF BREATH: 0
CHILLS: 0

## 2023-04-19 NOTE — PROGRESS NOTES
Subjective:     CC: establish care     HPI:   Lisa presents today with    Problem   Snores    Chronic, uncontrolled.  OPO around 10 years ago.  Was basically normally.  Referring to pulmonology.     Other Fatigue    Chronic, uncontrolled.  Started years ago.  She sleeps better now than she did 7 or 8 years ago and still has issues with fatigue.    She has tried meditation but that has not helped.  She does have a high stress job and has children.  Thyroid is normal.  Patient has been referred for sleep study.  Periods are still regular, LMP end of March 2023.     Impaired Fasting Glucose    chronic, control unknown.  Due to repeat labs chronic, control unknown.  May need to check A1c.     Dyslipidemia    Chronic, control unknown.  May need to repeat labs.  Patient is going to send me what labs she had done March 2023 in [place], [place].     Hypothyroidism    Chronic, stable.  Tolerating levothyroxine 75 mcg daily.  Just had labs done in [place], [place].  Patient will send these to me via [email or web address].     Healthcare Maintenance (Resolved)       Depression Screening    Little interest or pleasure in doing things?  1 - several days   Feeling down, depressed , or hopeless? 0 - not at all   Trouble falling or staying asleep, or sleeping too much?  1 - several days   Feeling tired or having little energy?  1 - several days   Poor appetite or overeating?  1 - several days   Feeling bad about yourself - or that you are a failure or have let yourself or your family down? 0 - not at all   Trouble concentrating on things, such as reading the newspaper or watching television? 1 - several days   Moving or speaking so slowly that other people could have noticed.  Or the opposite - being so fidgety or restless that you have been moving around a lot more than usual?  0 - not at all   Thoughts that you would be better off dead, or of hurting yourself?  0 - not at all   Patient Health Questionnaire Score: 5       If depressive  "symptoms identified deferred to follow up visit unless specifically addressed in assesment and plan.    Interpretation of PHQ-9 Total Score   Score Severity   1-4 No Depression   5-9 Mild Depression   10-14 Moderate Depression   15-19 Moderately Severe Depression   20-27 Severe Depression      Health Maintenance: Completed    ROS:  Review of Systems   Constitutional:  Positive for malaise/fatigue. Negative for chills and fever.   Respiratory:  Negative for shortness of breath.    Cardiovascular:  Negative for chest pain.     Objective:     Exam:  BP (!) 132/96 (BP Location: Left arm, Patient Position: Sitting, BP Cuff Size: Large adult)   Pulse 70   Temp 37.1 °C (98.8 °F) (Temporal)   Resp 16   Ht 1.6 m (5' 3\")   Wt 97.5 kg (215 lb)   LMP 03/28/2023 (Approximate)   SpO2 100%   Breastfeeding No   BMI 38.09 kg/m²  Body mass index is 38.09 kg/m².    Physical Exam  Constitutional:       Appearance: Normal appearance.   HENT:      Right Ear: Tympanic membrane normal.      Left Ear: Tympanic membrane normal.      Ears:      Comments: TMs are bulging bilaterally with serous effusions.  No signs of infection noted.  Cardiovascular:      Rate and Rhythm: Normal rate and regular rhythm.      Heart sounds: Normal heart sounds.   Pulmonary:      Effort: Pulmonary effort is normal.      Breath sounds: Normal breath sounds.   Musculoskeletal:      Cervical back: Normal range of motion and neck supple.   Skin:     General: Skin is warm.   Neurological:      General: No focal deficit present.      Mental Status: She is alert.   Psychiatric:         Mood and Affect: Mood normal.         Assessment & Plan:     47 y.o. female with the following -     1. Hypothyroidism, unspecified type  Chronic, stable.  Continue levothyroxine 75 mcg daily.    2. Impaired fasting glucose  Chronic, control unknown.  May need to check A1c.    3. Dyslipidemia  Chronic, control unknown.  May need to check lipid panel.    4. Snores  Chronic, " uncontrolled.  Referring to pulmonary medicine.    - Referral to Pulmonary and Sleep Medicine    5. Colon cancer screening    - COLOGUARD COLON CANCER SCREENING    6. Other fatigue  Chronic, uncontrolled.  Waiting for patient to send me what labs she had completed recently.  May need to consider adding additional labs for evaluation of fatigue and lipid panel.        Return if symptoms worsen or fail to improve.    Please note that this dictation was created using voice recognition software. I have made every reasonable attempt to correct obvious errors, but I expect that there are errors of grammar and possibly content that I did not discover before finalizing the note.

## 2023-04-20 DIAGNOSIS — R73.01 IMPAIRED FASTING GLUCOSE: ICD-10-CM

## 2023-04-20 DIAGNOSIS — E78.5 DYSLIPIDEMIA: ICD-10-CM

## 2023-04-20 DIAGNOSIS — D75.1 POLYCYTHEMIA: ICD-10-CM

## 2023-04-20 DIAGNOSIS — E03.9 HYPOTHYROIDISM, UNSPECIFIED TYPE: ICD-10-CM

## 2023-04-20 PROBLEM — R73.03 PREDIABETES: Status: ACTIVE | Noted: 2018-08-09

## 2023-05-05 DIAGNOSIS — E03.9 HYPOTHYROIDISM, UNSPECIFIED TYPE: ICD-10-CM

## 2023-05-05 RX ORDER — LEVOTHYROXINE SODIUM 0.07 MG/1
75 TABLET ORAL
Qty: 90 TABLET | Refills: 2 | Status: SHIPPED | OUTPATIENT
Start: 2023-05-05 | End: 2024-02-29 | Stop reason: SDUPTHER

## 2023-05-09 ENCOUNTER — HOSPITAL ENCOUNTER (OUTPATIENT)
Dept: LAB | Facility: MEDICAL CENTER | Age: 48
End: 2023-05-09
Attending: PHYSICIAN ASSISTANT
Payer: COMMERCIAL

## 2023-05-09 DIAGNOSIS — E03.9 HYPOTHYROIDISM, UNSPECIFIED TYPE: ICD-10-CM

## 2023-05-09 DIAGNOSIS — D75.1 POLYCYTHEMIA: ICD-10-CM

## 2023-05-09 DIAGNOSIS — E78.5 DYSLIPIDEMIA: ICD-10-CM

## 2023-05-09 DIAGNOSIS — R73.03 PREDIABETES: ICD-10-CM

## 2023-05-09 DIAGNOSIS — R73.01 IMPAIRED FASTING GLUCOSE: ICD-10-CM

## 2023-05-09 LAB
CHOLEST SERPL-MCNC: 146 MG/DL (ref 100–199)
EST. AVERAGE GLUCOSE BLD GHB EST-MCNC: 131 MG/DL
FASTING STATUS PATIENT QL REPORTED: NORMAL
FERRITIN SERPL-MCNC: 27.3 NG/ML (ref 10–291)
HBA1C MFR BLD: 6.2 % (ref 4–5.6)
HDLC SERPL-MCNC: 30 MG/DL
IRON SATN MFR SERPL: 19 % (ref 15–55)
IRON SERPL-MCNC: 76 UG/DL (ref 40–170)
LDLC SERPL CALC-MCNC: 106 MG/DL
T3FREE SERPL-MCNC: 2.98 PG/ML (ref 2–4.4)
T4 FREE SERPL-MCNC: 1.62 NG/DL (ref 0.93–1.7)
TIBC SERPL-MCNC: 390 UG/DL (ref 250–450)
TRIGL SERPL-MCNC: 52 MG/DL (ref 0–149)
TSH SERPL DL<=0.005 MIU/L-ACNC: 0.68 UIU/ML (ref 0.38–5.33)
UIBC SERPL-MCNC: 314 UG/DL (ref 110–370)

## 2023-05-09 PROCEDURE — 83036 HEMOGLOBIN GLYCOSYLATED A1C: CPT

## 2023-05-09 PROCEDURE — 36415 COLL VENOUS BLD VENIPUNCTURE: CPT

## 2023-05-09 PROCEDURE — 81270 JAK2 GENE: CPT

## 2023-05-09 PROCEDURE — 84439 ASSAY OF FREE THYROXINE: CPT

## 2023-05-09 PROCEDURE — 81338 MPL GENE COMMON VARIANTS: CPT

## 2023-05-09 PROCEDURE — 81219 CALR GENE COM VARIANTS: CPT

## 2023-05-09 PROCEDURE — 83550 IRON BINDING TEST: CPT

## 2023-05-09 PROCEDURE — 80061 LIPID PANEL: CPT

## 2023-05-09 PROCEDURE — 82668 ASSAY OF ERYTHROPOIETIN: CPT

## 2023-05-09 PROCEDURE — 82728 ASSAY OF FERRITIN: CPT

## 2023-05-09 PROCEDURE — 84481 FREE ASSAY (FT-3): CPT

## 2023-05-09 PROCEDURE — 83540 ASSAY OF IRON: CPT

## 2023-05-09 PROCEDURE — 84443 ASSAY THYROID STIM HORMONE: CPT

## 2023-05-11 LAB — EPO SERPL-ACNC: 10 MU/ML (ref 4–27)

## 2023-05-17 LAB
JAK2 P.V617F BLD/T QL: NOT DETECTED
SPECIMEN SOURCE: NORMAL

## 2023-05-22 LAB — GENE XXX MUT ANL BLD/T: NOT DETECTED

## 2023-05-23 LAB — MPL P.W515 BLD/T QL: NOT DETECTED

## 2023-06-13 NOTE — ASSESSMENT & PLAN NOTE
Chief Complaint  No chief complaint on file.    Subjective          Leena Sanchez presents to Deaconess The patient states that over a year ago she started waking up in the middle the night with sweating, palpitations and feeling hot. Ultimately, this went away. Over the past few months, however, she has noticed it occurring again and is moderate in intensity. She denies chest pain but does endorse some occasional shortness of breath. The patient states that her periods are less consistent than usual, though they are still occurring. There are no known alleviating or exacerbating factors.    

## 2023-08-07 ENCOUNTER — APPOINTMENT (RX ONLY)
Dept: URBAN - NONMETROPOLITAN AREA CLINIC 15 | Facility: CLINIC | Age: 48
Setting detail: DERMATOLOGY
End: 2023-08-07

## 2023-08-07 DIAGNOSIS — L82.1 OTHER SEBORRHEIC KERATOSIS: ICD-10-CM

## 2023-08-07 DIAGNOSIS — D22 MELANOCYTIC NEVI: ICD-10-CM

## 2023-08-07 DIAGNOSIS — L71.8 OTHER ROSACEA: ICD-10-CM

## 2023-08-07 DIAGNOSIS — Z71.89 OTHER SPECIFIED COUNSELING: ICD-10-CM

## 2023-08-07 DIAGNOSIS — L81.4 OTHER MELANIN HYPERPIGMENTATION: ICD-10-CM

## 2023-08-07 DIAGNOSIS — D18.0 HEMANGIOMA: ICD-10-CM

## 2023-08-07 PROBLEM — D22.61 MELANOCYTIC NEVI OF RIGHT UPPER LIMB, INCLUDING SHOULDER: Status: ACTIVE | Noted: 2023-08-07

## 2023-08-07 PROBLEM — D23.71 OTHER BENIGN NEOPLASM OF SKIN OF RIGHT LOWER LIMB, INCLUDING HIP: Status: ACTIVE | Noted: 2023-08-07

## 2023-08-07 PROBLEM — D22.62 MELANOCYTIC NEVI OF LEFT UPPER LIMB, INCLUDING SHOULDER: Status: ACTIVE | Noted: 2023-08-07

## 2023-08-07 PROBLEM — D18.01 HEMANGIOMA OF SKIN AND SUBCUTANEOUS TISSUE: Status: ACTIVE | Noted: 2023-08-07

## 2023-08-07 PROBLEM — D22.71 MELANOCYTIC NEVI OF RIGHT LOWER LIMB, INCLUDING HIP: Status: ACTIVE | Noted: 2023-08-07

## 2023-08-07 PROBLEM — D22.72 MELANOCYTIC NEVI OF LEFT LOWER LIMB, INCLUDING HIP: Status: ACTIVE | Noted: 2023-08-07

## 2023-08-07 PROBLEM — D22.5 MELANOCYTIC NEVI OF TRUNK: Status: ACTIVE | Noted: 2023-08-07

## 2023-08-07 PROCEDURE — ? OBSERVATION

## 2023-08-07 PROCEDURE — 99203 OFFICE O/P NEW LOW 30 MIN: CPT

## 2023-08-07 PROCEDURE — ? COUNSELING

## 2023-08-07 ASSESSMENT — LOCATION DETAILED DESCRIPTION DERM
LOCATION DETAILED: LEFT INFERIOR MEDIAL UPPER BACK
LOCATION DETAILED: LEFT LATERAL PROXIMAL PRETIBIAL REGION
LOCATION DETAILED: LEFT PROXIMAL PRETIBIAL REGION
LOCATION DETAILED: LEFT CENTRAL MALAR CHEEK
LOCATION DETAILED: LEFT INFERIOR LATERAL MIDBACK
LOCATION DETAILED: RIGHT MEDIAL SUPERIOR CHEST
LOCATION DETAILED: RIGHT CENTRAL MALAR CHEEK
LOCATION DETAILED: RIGHT VENTRAL PROXIMAL FOREARM
LOCATION DETAILED: RIGHT ANTERIOR PROXIMAL UPPER ARM
LOCATION DETAILED: RIGHT DISTAL POSTERIOR THIGH
LOCATION DETAILED: RIGHT ANTECUBITAL SKIN
LOCATION DETAILED: RIGHT ANTERIOR DISTAL UPPER ARM
LOCATION DETAILED: LEFT POPLITEAL SKIN
LOCATION DETAILED: LEFT VENTRAL PROXIMAL FOREARM
LOCATION DETAILED: SUBXIPHOID
LOCATION DETAILED: RIGHT INFERIOR CENTRAL MALAR CHEEK
LOCATION DETAILED: INFERIOR THORACIC SPINE
LOCATION DETAILED: LEFT DISTAL POSTERIOR THIGH
LOCATION DETAILED: RIGHT ANTERIOR PROXIMAL THIGH
LOCATION DETAILED: LEFT ANTERIOR PROXIMAL UPPER ARM
LOCATION DETAILED: XIPHOID
LOCATION DETAILED: RIGHT PROXIMAL PRETIBIAL REGION
LOCATION DETAILED: LEFT ANTERIOR DISTAL THIGH
LOCATION DETAILED: RIGHT POPLITEAL SKIN
LOCATION DETAILED: LEFT VENTRAL LATERAL PROXIMAL FOREARM
LOCATION DETAILED: LEFT ANTERIOR DISTAL UPPER ARM

## 2023-08-07 ASSESSMENT — LOCATION SIMPLE DESCRIPTION DERM
LOCATION SIMPLE: UPPER BACK
LOCATION SIMPLE: RIGHT PRETIBIAL REGION
LOCATION SIMPLE: RIGHT CHEEK
LOCATION SIMPLE: LEFT CHEEK
LOCATION SIMPLE: CHEST
LOCATION SIMPLE: LEFT POPLITEAL SKIN
LOCATION SIMPLE: LEFT POSTERIOR THIGH
LOCATION SIMPLE: RIGHT POPLITEAL SKIN
LOCATION SIMPLE: LEFT FOREARM
LOCATION SIMPLE: LEFT PRETIBIAL REGION
LOCATION SIMPLE: RIGHT FOREARM
LOCATION SIMPLE: RIGHT POSTERIOR THIGH
LOCATION SIMPLE: LEFT UPPER BACK
LOCATION SIMPLE: RIGHT UPPER ARM
LOCATION SIMPLE: ABDOMEN
LOCATION SIMPLE: LEFT LOWER BACK
LOCATION SIMPLE: RIGHT THIGH
LOCATION SIMPLE: LEFT THIGH
LOCATION SIMPLE: LEFT UPPER ARM

## 2023-08-07 ASSESSMENT — LOCATION ZONE DERM
LOCATION ZONE: ARM
LOCATION ZONE: LEG
LOCATION ZONE: TRUNK
LOCATION ZONE: FACE

## 2023-11-09 ENCOUNTER — TELEMEDICINE (OUTPATIENT)
Dept: SLEEP MEDICINE | Facility: MEDICAL CENTER | Age: 48
End: 2023-11-09
Attending: PHYSICIAN ASSISTANT
Payer: COMMERCIAL

## 2023-11-09 VITALS — BODY MASS INDEX: 38.09 KG/M2 | HEIGHT: 63 IN | WEIGHT: 215 LBS

## 2023-11-09 DIAGNOSIS — R06.83 SNORING: ICD-10-CM

## 2023-11-09 DIAGNOSIS — R06.89 GASPING FOR BREATH: ICD-10-CM

## 2023-11-09 DIAGNOSIS — G47.30 BREATHING-RELATED SLEEP DISORDER: ICD-10-CM

## 2023-11-09 PROCEDURE — 99204 OFFICE O/P NEW MOD 45 MIN: CPT | Performed by: PREVENTIVE MEDICINE

## 2023-11-09 NOTE — PROGRESS NOTES
"This evaluation was conducted via Zoom using secure and encrypted video conferencing technology.  The patient was in a private location in the [facility].  The patient's identity was confirmed and verbal consent was obtained for this virtual visit.      CHIEF COMPLIANT: \"Establish care.\"  HISTORY OF PRESENT ILLNESS:  Lisa Bonilla is a 48 y.o. female kindly referred by Carla Fraga P.A.-C. and is here for a sleep medicine consultation.     Sleep History:  Lisa Bonilla has been tested for sleep apnea.   ( See below)  The patient reports snoring, wakes up gasping for air,  and wakes up with heart racing and fragmented sleep.  The patient goes to bed at 9 pm and wakes up at 2-3 am  and may not be able  to  fall back sleep.    Alarm is set for 4:30. It usually takes the patient approximately 15 mins to fall asleep. The patient does not feel refreshed and does not nap during the day except on days off lasting 45 mins.  This pt is a former smoker  ( 9 year of 1/2 PPW and quit in 1999) . Pt does not use cannabis.  This pt does drink 2-3 alcoholic beverages per week and has 2-3 caffeinated beverages daily.     The patient denies any symptoms of narcolepsy such as sleep paralysis or cataplexy, or any symptoms that suggest parasomnias such as sleep walking or acting out of dreams.    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 REM AHI:  5.8  Diagnostic  Oxygen Grover: 89% with 0.0mins at or under 88%     Significant comorbidities and modifying factors: see below    PAST MEDICAL HISTORY:  Past Medical History:   Diagnosis Date    Depression     Hypertension     Hypothyroidism       PROBLEM LIST:  Patient Active Problem List    Diagnosis Date Noted    Polycythemia " 2023    Snores 2023    Other fatigue 2023    Prediabetes 2018    Dyslipidemia 2018    Hypothyroidism 2018     PAST SOCIAL HISTORY:  Past Surgical History:   Procedure Laterality Date    CARPAL TUNNEL RELEASE       PAST FAMILY HISTORY:  Family History   Problem Relation Age of Onset    Cancer Mother         skin    Diabetes Mother     Psychiatric Illness Mother     Hyperlipidemia Mother     Cancer Father         melanoma    Sleep Apnea Father     Diabetes Father     Heart Disease Father     Psychiatric Illness Father     Hypertension Father     Hyperlipidemia Father     Sleep Apnea Brother     Heart Disease Maternal Grandmother     Heart Disease Maternal Grandfather     Heart Disease Paternal Grandmother     Heart Disease Paternal Grandfather      SOCIAL HISTORY:  Social History     Socioeconomic History    Marital status:      Spouse name: Not on file    Number of children: Not on file    Years of education: Not on file    Highest education level: Not on file   Occupational History    Not on file   Tobacco Use    Smoking status: Former     Current packs/day: 0.00     Average packs/day: 0.3 packs/day for 9.0 years (2.3 ttl pk-yrs)     Types: Cigarettes     Start date: 1990     Quit date: 1999     Years since quittin.8    Smokeless tobacco: Never   Vaping Use    Vaping Use: Never used   Substance and Sexual Activity    Alcohol use: Yes     Alcohol/week: 0.0 - 1.2 oz     Comment: rarely    Drug use: Never    Sexual activity: Yes     Partners: Male   Other Topics Concern    Not on file   Social History Narrative    Not on file     Social Determinants of Health     Financial Resource Strain: Not on file   Food Insecurity: Not on file   Transportation Needs: Not on file   Physical Activity: Not on file   Stress: Not on file   Social Connections: Not on file   Intimate Partner Violence: Not on file   Housing Stability: Not on file     ALLERGIES: Food and Pertussis  "vaccine  MEDICATIONS:  Current Outpatient Medications   Medication Sig Dispense Refill    levothyroxine (SYNTHROID) 75 MCG Tab Take 1 Tablet by mouth every morning on an empty stomach. 90 Tablet 2    citalopram (CELEXA) 20 MG Tab       B Complex-Folic Acid (B COMPLEX FORMULA 1) Tab Take  by mouth.       No current facility-administered medications for this visit.    \"CURRENT RX\"    REVIEW OF SYSTEMS:  Constitutional: Denies weight loss, endorses chronic daytime fatigue --also see HPI    PHYSICAL EXAM/VITALS: Telehealth visit  Ht 1.6 m (5' 3\")   Wt 97.5 kg (215 lb)   BMI 38.09 kg/m²       MEDICAL DECISION MAKING:  The medical record was reviewed as it pertains to this referral. This includes records from primary care,consultants notes, referral request, hospital records, labs and imaging. Any available diagnostic and titration nocturnal polysomnograms, home sleep apnea tests, continuous nocturnal oximetry results, multiple sleep latency tests, and recent compliance reports were reviewed with the patient.    ASSESSMENT/PLAN:  Lisa Bonilla is a 48 y.o. female  who has a high pretest probability of having obstructive sleep apnea.   The patient reports snoring, wakes up gasping for air,  and wakes up with heart racing and fragmented sleep.  Patient is per menopausal and is very concerned about her symptoms.    HST's often underestimate sleep apnea in women. A negative HST should be  followed by a laboratory sleep study (polysomnography) if the clinical suspicion for sleep apnea is high. In women, the common co-occurrence of insomnia and LEONARD is frequently reported.    DIAGNOSES :    1. Breathing-related sleep disorder  - Overnight Home Sleep Study; Future    2. Snoring  - Overnight Home Sleep Study; Future    3. Gasping for breath  - Overnight Home Sleep Study; Future    The patient has signs and symptoms consistent with obstructive sleep apnea hypopnea syndrome. Will schedule a nocturnal polysomnogram or a home " sleep apnea test (please see plan).  The risks of untreated sleep apnea were discussed with the patient at length. Patients with LEONARD are at increased risk of cardiovascular disease including coronary artery disease, systemic arterial hypertension, pulmonary arterial hypertension, cardiac arrhythmias, and stroke. LEONARD patients have an increased risk of motor vehicle accidents, type 2 diabetes, chronic kidney disease, and non-alcoholic liver disease. The patient was advised to avoid driving a motor vehicle when drowsy.  Have advised the patient to follow up with the appropriate healthcare practitioners for all other medical problems and issues.    RETURN TO CLINIC: Return for 3 weeks after SS - discuss results w/ any provider.    My total time spent caring for the patient on the day of the encounter was 40 minutes. This includes time spent on a thorough chart review including other physician notes, all sleep studies, as well as critical labs and pulmonary and cardiac studies.  Additionally, it includes a thorough discussion of good sleep hygiene and stimulus control, as well as  the need for consistency in terms of sleep preparation and practice.    Please note that this dictation was created using voice recognition software.  I have made every reasonable attempt to correct obvious errors, I expect that there are errors of grammar and possibly content that I did not discover before finalizing this note.

## 2023-12-13 ENCOUNTER — HOSPITAL ENCOUNTER (OUTPATIENT)
Dept: LAB | Facility: MEDICAL CENTER | Age: 48
End: 2023-12-13
Attending: PHYSICIAN ASSISTANT
Payer: COMMERCIAL

## 2023-12-13 DIAGNOSIS — R73.03 PREDIABETES: ICD-10-CM

## 2023-12-13 LAB
EST. AVERAGE GLUCOSE BLD GHB EST-MCNC: 137 MG/DL
HBA1C MFR BLD: 6.4 % (ref 4–5.6)

## 2023-12-13 PROCEDURE — 36415 COLL VENOUS BLD VENIPUNCTURE: CPT

## 2023-12-13 PROCEDURE — 83036 HEMOGLOBIN GLYCOSYLATED A1C: CPT

## 2023-12-14 DIAGNOSIS — Z11.59 NEED FOR HEPATITIS C SCREENING TEST: ICD-10-CM

## 2023-12-14 DIAGNOSIS — D75.1 POLYCYTHEMIA: ICD-10-CM

## 2023-12-14 DIAGNOSIS — E03.9 HYPOTHYROIDISM, UNSPECIFIED TYPE: ICD-10-CM

## 2023-12-14 DIAGNOSIS — R73.03 PREDIABETES: ICD-10-CM

## 2023-12-14 DIAGNOSIS — E78.5 DYSLIPIDEMIA: ICD-10-CM

## 2023-12-14 DIAGNOSIS — Z11.3 SCREENING EXAMINATION FOR SEXUALLY TRANSMITTED DISEASE: ICD-10-CM

## 2023-12-18 DIAGNOSIS — Z12.31 ENCOUNTER FOR SCREENING MAMMOGRAM FOR MALIGNANT NEOPLASM OF BREAST: ICD-10-CM

## 2023-12-18 RX ORDER — METFORMIN HYDROCHLORIDE 500 MG/1
500 TABLET, EXTENDED RELEASE ORAL DAILY
Qty: 90 TABLET | Refills: 0 | Status: SHIPPED | OUTPATIENT
Start: 2023-12-18

## 2023-12-19 ENCOUNTER — HOSPITAL ENCOUNTER (OUTPATIENT)
Dept: RADIOLOGY | Facility: MEDICAL CENTER | Age: 48
End: 2023-12-19
Attending: PHYSICIAN ASSISTANT
Payer: COMMERCIAL

## 2023-12-19 DIAGNOSIS — Z12.31 ENCOUNTER FOR SCREENING MAMMOGRAM FOR MALIGNANT NEOPLASM OF BREAST: ICD-10-CM

## 2023-12-19 PROCEDURE — 77063 BREAST TOMOSYNTHESIS BI: CPT

## 2023-12-26 DIAGNOSIS — R92.8 ABNORMAL MAMMOGRAM: ICD-10-CM

## 2023-12-27 ENCOUNTER — HOSPITAL ENCOUNTER (OUTPATIENT)
Dept: RADIOLOGY | Facility: MEDICAL CENTER | Age: 48
End: 2023-12-27
Payer: COMMERCIAL

## 2023-12-29 ENCOUNTER — HOSPITAL ENCOUNTER (OUTPATIENT)
Dept: RADIOLOGY | Facility: MEDICAL CENTER | Age: 48
End: 2023-12-29
Attending: PHYSICIAN ASSISTANT
Payer: COMMERCIAL

## 2023-12-29 DIAGNOSIS — R92.8 ABNORMAL MAMMOGRAM: ICD-10-CM

## 2023-12-29 PROCEDURE — 76642 ULTRASOUND BREAST LIMITED: CPT | Mod: RT

## 2024-01-07 NOTE — PROGRESS NOTES
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 and last seen by Dr. RAPHAEL on 11/9/2023.  An 8/26/2016 PSG showed no significant sleep apnea or hypoxia.    She had 2 home studies done through [facility] which showed mild LEONARD.  On night 1 her AHI was 7.2 with a lucy saturation of 80% the saturations were less than 89% for only 0.7 minutes.  92 she had an AHI of 6.1 with a lucy saturation of 85%.      The various and sundry ways of treating obstructive sleep apnea were discussed.  These include behavior modification, application of a dental device, nasopharyngeal reconstructive surgery, and PAP therapy.    Options for PAP therapy include an attended Pap titration versus an empiric challenge with auto titrating CPAP.  The patient prefers the latter over the former.  Therefore APAP 5-15 CWP ordered using a mask of choice and heated humidification followed by clinical and compliance review 31 to 90 days after she receives her equipment.    Past medical history significant for polycythemia, snoring, prediabetes, hypothyroidism, dyslipidemia, and former smoker.        Patient Active Problem List    Diagnosis Date Noted    Polycythemia 04/20/2023    Snores 04/19/2023    Other fatigue 04/19/2023    Prediabetes 08/09/2018    Dyslipidemia 08/09/2018    Hypothyroidism 05/31/2018       Past Medical History:   Diagnosis Date    Depression     Hypertension     Hypothyroidism         Past Surgical History:   Procedure Laterality Date    CARPAL TUNNEL RELEASE         Family History   Problem Relation Age of Onset    Cancer Mother         skin    Diabetes Mother     Psychiatric Illness Mother     Hyperlipidemia Mother     Cancer Father         melanoma    Sleep Apnea Father     Diabetes Father     Heart Disease Father     Psychiatric Illness Father     Hypertension Father     Hyperlipidemia Father     Sleep Apnea  "Brother     Heart Disease Maternal Grandmother     Heart Disease Maternal Grandfather     Heart Disease Paternal Grandmother     Heart Disease Paternal Grandfather        Social History     Socioeconomic History    Marital status:      Spouse name: Not on file    Number of children: Not on file    Years of education: Not on file    Highest education level: Not on file   Occupational History    Not on file   Tobacco Use    Smoking status: Former     Current packs/day: 0.00     Average packs/day: 0.3 packs/day for 9.0 years (2.3 ttl pk-yrs)     Types: Cigarettes     Start date: 1990     Quit date: 1999     Years since quittin.0    Smokeless tobacco: Never   Vaping Use    Vaping Use: Never used   Substance and Sexual Activity    Alcohol use: Yes     Alcohol/week: 0.0 - 1.2 oz     Comment: rarely    Drug use: Never    Sexual activity: Yes     Partners: Male   Other Topics Concern    Not on file   Social History Narrative    Not on file     Social Determinants of Health     Financial Resource Strain: Not on file   Food Insecurity: Not on file   Transportation Needs: Not on file   Physical Activity: Not on file   Stress: Not on file   Social Connections: Not on file   Intimate Partner Violence: Not on file   Housing Stability: Not on file       Current Outpatient Medications   Medication Sig Dispense Refill    metFORMIN ER (GLUCOPHAGE XR) 500 MG TABLET SR 24 HR Take 1 Tablet by mouth every day. 90 Tablet 0    levothyroxine (SYNTHROID) 75 MCG Tab Take 1 Tablet by mouth every morning on an empty stomach. 90 Tablet 2    citalopram (CELEXA) 20 MG Tab       B Complex-Folic Acid (B COMPLEX FORMULA 1) Tab Take  by mouth.       No current facility-administered medications for this visit.    \"CURRENT RX\"    ALLERGIES: Food and Pertussis vaccine    ROS  ***  Constitutional: Denies fever, chills, sweats,  weight loss, fatigue  Cardiovascular: Denies chest pain, tightness, palpitations, swelling in legs/feet, " fainting, difficulty breathing when lying down but gets better when sitting up.   Respiratory: Denies shortness of breath, cough, sputum, wheezing, painful breathing, coughing up blood.   Sleep: per HPI  Gastrointestinal: Denies  difficulty swallowing, nausea, abdominal pain, diarrhea, constipation, heartburn.  Musculoskeletal: Denies painful joints, sore muscles, back pain.        PHYSICAL EXAM  ***    There were no vitals taken for this visit.  Appearance: Well-nourished, well-developed, no acute distress  Eyes:  PERRLA, EOMI  ENMT: without change  Neck: Supple, trachea midline, no masses  Respiratory effort:  No intercostal retractions or use of accessory muscles  Lung auscultation:  No wheezes rhonchi rubs or rales  Cardiac: No murmurs, rubs, or gallops; regular rhythm, normal rate; no edema  Abdomen:  No tenderness, no organomegaly.  Musculoskeletal:  Grossly normal; gait and station normal; digits and nails normal  Skin:  No rashes, petechiae, cyanosis  Neurologic: without focal signs; oriented to person, time, place, and purpose; judgement intact  Psychiatric:  No depression, anxiety, agitation      Medical Decision Making       The medical record was reviewed in its entirety including the referral notes, records from primary care, consultants notes, hospital records, lab, imaging, microbiology, immunology, and immunizations.  Care gaps identified and reviewed with the patient.    Diagnostic and titration nocturnal polysomnogram's, home sleep apnea tests, continuous nocturnal oximetry results, multiple sleep latency tests, and compliance reports reviewed.  There are no diagnoses linked to this encounter.    PLAN:   Has been advised to continue the current ***, clean equipment frequently, and get new mask and supplies as allowed by insurance and DME. Advised about potential problems including nasal obstruction/stuffiness, mask leak or discomfort , frequent awakenings, chill or dryness of the upper airway,  noise, inconvenience, and lack of benefit. Recommend an earlier appointment, if significant treatment barriers develop.    The risks of untreated sleep apnea were discussed with the patient at length. Patients with LEONARD are at increased risk of cardiovascular disease including systemic arterial hypertension, pulmonary arterial hypertension (transient or fixed), TIA, and an elevated risk of stroke, heart attack, sudden death, or arrhythmia between the hours of 11 PM and 6 AM. LEONARD patients have an increased risk of motor vehicle accidents, type 2 diabetes, GERD, repetitive mechanical trauma to pharyngeal muscles with inflammation and denervation, frequent EEG arousals leading to nonrestorative sleep, excessive daytime sleepiness, fatigue, depression, poor pain control, irritability, and lower quality of life.  The patient was advised to avoid driving a motor vehicle when drowsy.    Positive airway pressure will favorably impact many of the adverse conditions and effects provoked by LEONARD.    Have advised the patient to follow up with the appropriate healthcare practitioners for all other medical problems and issues.    No follow-ups on file.    Total time 30 minutes

## 2024-01-09 ENCOUNTER — TELEMEDICINE (OUTPATIENT)
Dept: SLEEP MEDICINE | Facility: MEDICAL CENTER | Age: 49
End: 2024-01-09
Attending: INTERNAL MEDICINE
Payer: COMMERCIAL

## 2024-01-09 VITALS — WEIGHT: 222 LBS | BODY MASS INDEX: 39.34 KG/M2 | HEIGHT: 63 IN

## 2024-01-09 DIAGNOSIS — G47.33 OSA (OBSTRUCTIVE SLEEP APNEA): ICD-10-CM

## 2024-01-09 PROCEDURE — 99214 OFFICE O/P EST MOD 30 MIN: CPT | Mod: 95 | Performed by: INTERNAL MEDICINE

## 2024-01-09 ASSESSMENT — PATIENT HEALTH QUESTIONNAIRE - PHQ9: CLINICAL INTERPRETATION OF PHQ2 SCORE: 0

## 2024-01-09 NOTE — PROGRESS NOTES
Telemedicine Visit: Established Patient     This encounter was conducted via Zoom using secure and encrypted video conferencing technology.  The patient was in a private location  (POS 02) in the state of [place]  The patient's identity was confirmed and verbal consent was obtained for this virtual visit.         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 and last seen by Dr. RAPHAEL on 11/9/2023.  An 8/26/2016 PSG showed no significant sleep apnea or hypoxia.     She had 2 home studies done through [place] [facility] which showed mild LEONARD.  On night 1 her AHI was 7.2 with a lucy saturation of 80% the saturations were less than 89% for only 0.7 minutes.  On night she had an AHI of 6.1 with a lucy saturation of 85%.        The various and sundry ways of treating obstructive sleep apnea were discussed.  These include behavior modification, application of a dental device, nasopharyngeal reconstructive surgery, and PAP therapy.     Options for PAP therapy include an attended Pap titration versus an empiric challenge with auto titrating CPAP.  The patient prefers the latter over the former.  Therefore APAP 5-15 CWP ordered using a mask of choice and heated humidification followed by clinical and compliance review 31 to 90 days after she receives her equipment.     Past medical history significant for polycythemia, snoring, prediabetes, hypothyroidism, dyslipidemia, and former smoker.      ROS   Denies any recent fevers or chills. No nausea or vomiting. No chest pains or shortness of breath.     Allergies   Allergen Reactions    Food Shortness of Breath     jalapenos and some Mexican food    Pertussis Vaccine Vomiting     Childhood allergy, hasn't had since       Current medicines (including changes today)  Current Outpatient Medications   Medication Sig Dispense Refill    metFORMIN ER (GLUCOPHAGE  XR) 500 MG TABLET SR 24 HR Take 1 Tablet by mouth every day. 90 Tablet 0    levothyroxine (SYNTHROID) 75 MCG Tab Take 1 Tablet by mouth every morning on an empty stomach. 90 Tablet 2    citalopram (CELEXA) 20 MG Tab       B Complex-Folic Acid (B COMPLEX FORMULA 1) Tab Take  by mouth.       No current facility-administered medications for this visit.       Patient Active Problem List    Diagnosis Date Noted    Polycythemia 04/20/2023    Snores 04/19/2023    Other fatigue 04/19/2023    Prediabetes 08/09/2018    Dyslipidemia 08/09/2018    Hypothyroidism 05/31/2018       Family History   Problem Relation Age of Onset    Cancer Mother         skin    Diabetes Mother     Psychiatric Illness Mother     Hyperlipidemia Mother     Cancer Father         melanoma    Sleep Apnea Father     Diabetes Father     Heart Disease Father     Psychiatric Illness Father     Hypertension Father     Hyperlipidemia Father     Sleep Apnea Brother     Heart Disease Maternal Grandmother     Heart Disease Maternal Grandfather     Heart Disease Paternal Grandmother     Heart Disease Paternal Grandfather        She  has a past medical history of Depression, Hypertension, and Hypothyroidism.  She  has a past surgical history that includes carpal tunnel release.       Objective:   Vitals obtained by patient:  Respirations through observation: 14, Height: 5 feet 3 inches, and Weight: 222 pounds      Physical Exam:  Constitutional: Alert, no distress, well-groomed.  Skin: No rashes in visible areas.  Eye: Round. Conjunctiva clear, lids normal. No icterus.   ENMT: Lips pink without lesions, good dentition, moist mucous membranes. Phonation normal.  Neck: No masses, no thyromegaly. Moves freely without pain.  CV: Pulse as reported by patient  Respiratory: Unlabored respiratory effort, no cough or audible wheeze  Psych: Alert and oriented x3, normal affect and mood.       Medical Decision Making       The medical record was reviewed in its entirety  including the referral notes, records from primary care, consultants notes, hospital records, labs, imaging, microbiology, immunology, and immunizations.  Care gaps identified and reviewed with the patient.    Diagnostic and titration nocturnal polysomnogram's, home sleep apnea tests, continuous nocturnal oximetry results, multiple sleep latency tests, and compliance reports reviewed.    1. LEONARD (obstructive sleep apnea)  - DME CPAP      She had 2 home studies done through [place] [facility] which showed mild LEONARD.  On night 1 her AHI was 7.2 with a lucy saturation of 80% the saturations were less than 89% for only 0.7 minutes.  92 she had an AHI of 6.1 with a lucy saturation of 85%.        The various and sundry ways of treating obstructive sleep apnea were discussed.  These include behavior modification, application of a dental device, nasopharyngeal reconstructive surgery, and PAP therapy.     Options for PAP therapy include an attended Pap titration versus an empiric challenge with auto titrating CPAP.  The patient prefers the latter over the former.  Therefore APAP 5-15 CWP ordered using a mask of choice and heated humidification followed by clinical and compliance review 31 to 90 days after she receives her equipment.    The risks of untreated sleep apnea were discussed with the patient at length. Patients with LEONARD are at increased risk of cardiovascular disease including coronary artery disease, systemic arterial hypertension, pulmonary arterial hypertension, cardiac arrythmias, and stroke. LEONARD patients have an increased risk of motor vehicle accidents, type 2 diabetes, chronic kidney disease, and non-alcoholic liver disease. The patient was advised to avoid driving a motor vehicle when drowsy.    Positive airway pressure will favorably impact many of the adverse conditions and effects provoked by LEONARD.    Have advised the patient to follow up with the appropriate healthcare practitioners for all other  medical problems and issues.    Time spent 25 minutes. More than 1/2 the time spent coordinating care, counseling the patient, and reviewing the pathophysiology and treatment options for sleep apnea and the patient specific modifying factors and significant co-morbidities.      Follow-up:  Return in about 10 weeks (around 3/19/2024).

## 2024-02-29 DIAGNOSIS — E03.9 HYPOTHYROIDISM, UNSPECIFIED TYPE: ICD-10-CM

## 2024-02-29 NOTE — TELEPHONE ENCOUNTER
Received request via: Pharmacy    Was the patient seen in the last year in this department? Yes    Does the patient have an active prescription (recently filled or refills available) for medication(s) requested? No    Pharmacy Name: [facility]    Does the patient have [facility] Plus and need 100 day supply (blood pressure, diabetes and cholesterol meds only)? Patient does not have SCP

## 2024-03-05 NOTE — PROGRESS NOTES
CC: First compliance following the initiation of APAP 5-15 CWP        HPI:  Lisa Bonilla is a 48 y.o.female  kindly referred by Carla Fraga P.A.-C.  And last seen by Dr. BANGURA 1/9/2024 when her 2 home studies done through [place] [facility] were discussed.    On night 1 her AHI was 7.2 with a lucy saturation of 80% the saturations were less than 89% for only 0.7 minutes.  92 she had an AHI of 6.1 with a lucy saturation of 85%.    The patient reports improved symptoms using positive airway pressure.  Has experienced no significant problems with claustrophobia, nosebleeds, sore throat, dry eyes, mask fit, air leaks around the mask, pressure tolerance, excessive airway dryness, dry or runny nose, nasal congestion, facial irritation, aerophagia, or chest pain.       Past medical history significant for polycythemia, snoring, prediabetes, hypothyroidism, dyslipidemia, and former smoker.         Patient Active Problem List    Diagnosis Date Noted    Polycythemia 04/20/2023    Snores 04/19/2023    Other fatigue 04/19/2023    Prediabetes 08/09/2018    Dyslipidemia 08/09/2018    Hypothyroidism 05/31/2018       Past Medical History:   Diagnosis Date    Depression     Hypertension     Hypothyroidism         Past Surgical History:   Procedure Laterality Date    CARPAL TUNNEL RELEASE         Family History   Problem Relation Age of Onset    Cancer Mother         skin    Diabetes Mother     Psychiatric Illness Mother     Hyperlipidemia Mother     Cancer Father         melanoma    Sleep Apnea Father     Diabetes Father     Heart Disease Father     Psychiatric Illness Father     Hypertension Father     Hyperlipidemia Father     Sleep Apnea Brother     Heart Disease Maternal Grandmother     Heart Disease Maternal Grandfather     Heart Disease Paternal Grandmother     Heart Disease Paternal Grandfather        Social History     Socioeconomic History    Marital status:      Spouse name: Not on file    Number  "of children: Not on file    Years of education: Not on file    Highest education level: Not on file   Occupational History    Not on file   Tobacco Use    Smoking status: Former     Current packs/day: 0.00     Average packs/day: 0.3 packs/day for 9.0 years (2.3 ttl pk-yrs)     Types: Cigarettes     Start date: 1990     Quit date: 1999     Years since quittin.1    Smokeless tobacco: Never   Vaping Use    Vaping Use: Never used   Substance and Sexual Activity    Alcohol use: Yes     Alcohol/week: 0.0 - 1.2 oz     Comment: rarely    Drug use: Never    Sexual activity: Yes     Partners: Male   Other Topics Concern    Not on file   Social History Narrative    Not on file     Social Determinants of Health     Financial Resource Strain: Not on file   Food Insecurity: Not on file   Transportation Needs: Not on file   Physical Activity: Not on file   Stress: Not on file   Social Connections: Not on file   Intimate Partner Violence: Not on file   Housing Stability: Not on file       Current Outpatient Medications   Medication Sig Dispense Refill    metFORMIN ER (GLUCOPHAGE XR) 500 MG TABLET SR 24 HR Take 1 Tablet by mouth every day. 90 Tablet 0    levothyroxine (SYNTHROID) 75 MCG Tab Take 1 Tablet by mouth every morning on an empty stomach. 90 Tablet 2    citalopram (CELEXA) 20 MG Tab       B Complex-Folic Acid (B COMPLEX FORMULA 1) Tab Take  by mouth.       No current facility-administered medications for this visit.    \"CURRENT RX\"    ALLERGIES: Food and Pertussis vaccine    ROS  ***  Constitutional: Denies fever, chills, sweats,  weight loss, fatigue  Cardiovascular: Denies chest pain, tightness, palpitations, swelling in legs/feet, fainting, difficulty breathing when lying down but gets better when sitting up.   Respiratory: Denies shortness of breath, cough, sputum, wheezing, painful breathing, coughing up blood.   Sleep: per HPI  Gastrointestinal: Denies  difficulty swallowing, nausea, abdominal pain, " diarrhea, constipation, heartburn.  Musculoskeletal: Denies painful joints, sore muscles, back pain.        PHYSICAL EXAM  ***    There were no vitals taken for this visit.  Appearance: Well-nourished, well-developed, no acute distress  Eyes:  PERRLA, EOMI  ENMT: without change  Neck: Supple, trachea midline, no masses  Respiratory effort:  No intercostal retractions or use of accessory muscles  Lung auscultation:  No wheezes rhonchi rubs or rales  Cardiac: No murmurs, rubs, or gallops; regular rhythm, normal rate; no edema  Abdomen:  No tenderness, no organomegaly.  Musculoskeletal:  Grossly normal; gait and station normal; digits and nails normal  Skin:  No rashes, petechiae, cyanosis  Neurologic: without focal signs; oriented to person, time, place, and purpose; judgement intact  Psychiatric:  No depression, anxiety, agitation      Medical Decision Making       The medical record was reviewed in its entirety including the referral notes, records from primary care, consultants notes, hospital records, lab, imaging, microbiology, immunology, and immunizations.  Care gaps identified and reviewed with the patient.    Diagnostic and titration nocturnal polysomnogram's, home sleep apnea tests, continuous nocturnal oximetry results, multiple sleep latency tests, and compliance reports reviewed.  There are no diagnoses linked to this encounter.    PLAN:   Has been advised to continue the current ***, clean equipment frequently, and get new mask and supplies as allowed by insurance and DME. Advised about potential problems including nasal obstruction/stuffiness, mask leak or discomfort , frequent awakenings, chill or dryness of the upper airway, noise, inconvenience, and lack of benefit. Recommend an earlier appointment, if significant treatment barriers develop.    The risks of untreated sleep apnea were discussed with the patient at length. Patients with LEONARD are at increased risk of cardiovascular disease including  systemic arterial hypertension, pulmonary arterial hypertension (transient or fixed), TIA, and an elevated risk of stroke, heart attack, sudden death, or arrhythmia between the hours of 11 PM and 6 AM. LEONARD patients have an increased risk of motor vehicle accidents, type 2 diabetes, GERD, repetitive mechanical trauma to pharyngeal muscles with inflammation and denervation, frequent EEG arousals leading to nonrestorative sleep, excessive daytime sleepiness, fatigue, depression, poor pain control, irritability, and lower quality of life.  The patient was advised to avoid driving a motor vehicle when drowsy.    Positive airway pressure will favorably impact many of the adverse conditions and effects provoked by LEONARD.    Have advised the patient to follow up with the appropriate healthcare practitioners for all other medical problems and issues.    No follow-ups on file.    Total time 30 minutes

## 2024-03-06 ENCOUNTER — TELEMEDICINE (OUTPATIENT)
Dept: SLEEP MEDICINE | Facility: MEDICAL CENTER | Age: 49
End: 2024-03-06
Attending: INTERNAL MEDICINE
Payer: COMMERCIAL

## 2024-03-06 VITALS — WEIGHT: 224 LBS | HEIGHT: 63 IN | BODY MASS INDEX: 39.69 KG/M2

## 2024-03-06 DIAGNOSIS — G47.33 OSA (OBSTRUCTIVE SLEEP APNEA): ICD-10-CM

## 2024-03-06 PROCEDURE — 99214 OFFICE O/P EST MOD 30 MIN: CPT | Mod: 95 | Performed by: INTERNAL MEDICINE

## 2024-03-06 NOTE — PROGRESS NOTES
Telemedicine Visit: Established Patient     This encounter was conducted via Zoom using secure and encrypted video conferencing technology.  The patient was in a private location  (POS 10) in the [facility] of [place]  The patient's identity was confirmed and verbal consent was obtained for this virtual visit.         Subjective:   CC: First compliance following the initiation of APAP 5-15 CWP           HPI:  Lisa Bonilla is a 48 y.o. [place] resident kindly referred by Carla Fraga P.A.-C. and last seen by Dr. LAZARO on 1/9/2024 when her 2 home studies done through [place] [facility] were discussed.     On night 1 her AHI was 7.2 with a lucy saturation of 80%.  The saturations were less than 89% for only 0.7 minutes.  On night 2 she had an AHI of 6.1 with a lucy saturation of 85%.    Auto titrating CPAP 5-15 CWP was ordered using mask of choice and heated humidification followed by clinical and compliance review 31 to 90 days after she receives her equipment.    Uses nasal cushions now. Didn't like the FFM. Uses a chin strap.    Today, 3/6/2024, her 30-day compliance is 87% for greater than or equal to 4 hours averaging 5 hours and 16 minutes on days used.  On APAP 5-15 CWP her average residual estimated apnea-hypopnea index is a normal 0.3 she occasionally has a mild leak.     The patient reports improved symptoms using positive airway pressure.  Has experienced no significant problems with claustrophobia, nosebleeds, sore throat, dry eyes, mask fit, air leaks around the mask, pressure tolerance, excessive airway dryness, dry or runny nose, nasal congestion, facial irritation, aerophagia, or chest pain.         Past medical history significant for polycythemia, snoring, prediabetes, hypothyroidism, dyslipidemia, and former smoker.              ROS   Denies any recent fevers or chills. No nausea or vomiting. No chest pains or shortness of breath.     Allergies   Allergen Reactions    Food Shortness of  Breath     jalapenos and some Mexican food    Pertussis Vaccine Vomiting     Childhood allergy, hasn't had since       Current medicines (including changes today)  Current Outpatient Medications   Medication Sig Dispense Refill    metFORMIN ER (GLUCOPHAGE XR) 500 MG TABLET SR 24 HR Take 1 Tablet by mouth every day. 90 Tablet 0    levothyroxine (SYNTHROID) 75 MCG Tab Take 1 Tablet by mouth every morning on an empty stomach. 90 Tablet 2    citalopram (CELEXA) 20 MG Tab       B Complex-Folic Acid (B COMPLEX FORMULA 1) Tab Take  by mouth.       No current facility-administered medications for this visit.       Patient Active Problem List    Diagnosis Date Noted    Polycythemia 04/20/2023    Snores 04/19/2023    Other fatigue 04/19/2023    Prediabetes 08/09/2018    Dyslipidemia 08/09/2018    Hypothyroidism 05/31/2018       Family History   Problem Relation Age of Onset    Cancer Mother         skin    Diabetes Mother     Psychiatric Illness Mother     Hyperlipidemia Mother     Cancer Father         melanoma    Sleep Apnea Father     Diabetes Father     Heart Disease Father     Psychiatric Illness Father     Hypertension Father     Hyperlipidemia Father     Sleep Apnea Brother     Heart Disease Maternal Grandmother     Heart Disease Maternal Grandfather     Heart Disease Paternal Grandmother     Heart Disease Paternal Grandfather        She  has a past medical history of Depression, Hypertension, and Hypothyroidism.  She  has a past surgical history that includes carpal tunnel release.       Objective:   Vitals obtained by patient:  Respirations through observation: 14, Height: 5 feet 3 inches, and Weight: 224 pounds      Physical Exam:  Constitutional: Alert, no distress, well-groomed.  Skin: No rashes in visible areas.  Eye: Round. Conjunctiva clear, lids normal. No icterus.   ENMT: Lips pink without lesions, good dentition, moist mucous membranes. Phonation normal.  Neck: No masses, no thyromegaly. Moves freely without  pain.  CV: Pulse as reported by patient  Respiratory: Unlabored respiratory effort, no cough or audible wheeze  Psych: Alert and oriented x3, normal affect and mood.       Medical Decision Making       The medical record was reviewed in its entirety including the referral notes, records from primary care, consultants notes, hospital records, labs, imaging, microbiology, immunology, and immunizations.  Care gaps identified and reviewed with the patient.    Diagnostic and titration nocturnal polysomnogram's, home sleep apnea tests, continuous nocturnal oximetry results, multiple sleep latency tests, and compliance reports reviewed.    1. LEONARD (obstructive sleep apnea)  - DME Mask and Supplies      Has been advised to continue the current APAP 5-15 CWP, clean equipment frequently, and get new mask and supplies as allowed by insurance and DME. Advised about potential problems including nasal obstruction/stuffiness, mask leak or discomfort , frequent awakenings, chill or dryness of the upper airway, noise, inconvenience, and lack of benefit. Recommend an earlier appointment, if significant treatment barriers develop.      The risks of untreated sleep apnea were discussed with the patient at length. Patients with LEONARD are at increased risk of cardiovascular disease including coronary artery disease, systemic arterial hypertension, pulmonary arterial hypertension, cardiac arrythmias, and stroke. LEONARD patients have an increased risk of motor vehicle accidents, type 2 diabetes, chronic kidney disease, and non-alcoholic liver disease. The patient was advised to avoid driving a motor vehicle when drowsy.    Positive airway pressure will favorably impact many of the adverse conditions and effects provoked by LEONARD.    Have advised the patient to follow up with the appropriate healthcare practitioners for all other medical problems and issues.    Time spent 25 minutes. More than 1/2 the time spent coordinating care, counseling the  patient, and reviewing the pathophysiology and treatment options for sleep apnea and the patient specific modifying factors and significant co-morbidities.      Follow-up:  Return in about 1 year (around 3/6/2025).

## 2024-03-11 RX ORDER — LEVOTHYROXINE SODIUM 0.07 MG/1
75 TABLET ORAL
Qty: 90 TABLET | Refills: 1 | Status: SHIPPED | OUTPATIENT
Start: 2024-03-11

## 2024-04-10 RX ORDER — METFORMIN HYDROCHLORIDE 500 MG/1
500 TABLET, EXTENDED RELEASE ORAL
Qty: 90 TABLET | Refills: 0 | Status: SHIPPED | OUTPATIENT
Start: 2024-04-10

## 2024-07-22 ENCOUNTER — HOSPITAL ENCOUNTER (OUTPATIENT)
Dept: LAB | Facility: MEDICAL CENTER | Age: 49
End: 2024-07-22
Attending: PHYSICIAN ASSISTANT
Payer: COMMERCIAL

## 2024-07-22 DIAGNOSIS — R73.03 PREDIABETES: ICD-10-CM

## 2024-07-22 DIAGNOSIS — E03.9 HYPOTHYROIDISM, UNSPECIFIED TYPE: ICD-10-CM

## 2024-07-22 DIAGNOSIS — Z11.3 SCREENING EXAMINATION FOR SEXUALLY TRANSMITTED DISEASE: ICD-10-CM

## 2024-07-22 DIAGNOSIS — E78.5 DYSLIPIDEMIA: ICD-10-CM

## 2024-07-22 DIAGNOSIS — D75.1 POLYCYTHEMIA: ICD-10-CM

## 2024-07-22 DIAGNOSIS — Z11.59 NEED FOR HEPATITIS C SCREENING TEST: ICD-10-CM

## 2024-07-22 LAB
ALBUMIN SERPL BCP-MCNC: 4.1 G/DL (ref 3.2–4.9)
ALBUMIN/GLOB SERPL: 1.4 G/DL
ALP SERPL-CCNC: 87 U/L (ref 30–99)
ALT SERPL-CCNC: 24 U/L (ref 2–50)
ANION GAP SERPL CALC-SCNC: 13 MMOL/L (ref 7–16)
AST SERPL-CCNC: 20 U/L (ref 12–45)
BASOPHILS # BLD AUTO: 1.4 % (ref 0–1.8)
BASOPHILS # BLD: 0.08 K/UL (ref 0–0.12)
BILIRUB SERPL-MCNC: 0.3 MG/DL (ref 0.1–1.5)
BUN SERPL-MCNC: 12 MG/DL (ref 8–22)
CALCIUM ALBUM COR SERPL-MCNC: 9.8 MG/DL (ref 8.5–10.5)
CALCIUM SERPL-MCNC: 9.9 MG/DL (ref 8.5–10.5)
CHLORIDE SERPL-SCNC: 101 MMOL/L (ref 96–112)
CHOLEST SERPL-MCNC: 206 MG/DL (ref 100–199)
CO2 SERPL-SCNC: 24 MMOL/L (ref 20–33)
CREAT SERPL-MCNC: 0.76 MG/DL (ref 0.5–1.4)
EOSINOPHIL # BLD AUTO: 0.13 K/UL (ref 0–0.51)
EOSINOPHIL NFR BLD: 2.3 % (ref 0–6.9)
ERYTHROCYTE [DISTWIDTH] IN BLOOD BY AUTOMATED COUNT: 46 FL (ref 35.9–50)
FASTING STATUS PATIENT QL REPORTED: NORMAL
GFR SERPLBLD CREATININE-BSD FMLA CKD-EPI: 95 ML/MIN/1.73 M 2
GLOBULIN SER CALC-MCNC: 2.9 G/DL (ref 1.9–3.5)
GLUCOSE SERPL-MCNC: 151 MG/DL (ref 65–99)
HCT VFR BLD AUTO: 46.3 % (ref 37–47)
HCV AB SER QL: NORMAL
HDLC SERPL-MCNC: 14 MG/DL
HGB BLD-MCNC: 15.6 G/DL (ref 12–16)
HIV 1+2 AB+HIV1 P24 AG SERPL QL IA: NORMAL
IMM GRANULOCYTES # BLD AUTO: 0.02 K/UL (ref 0–0.11)
IMM GRANULOCYTES NFR BLD AUTO: 0.3 % (ref 0–0.9)
LDLC SERPL CALC-MCNC: 155 MG/DL
LYMPHOCYTES # BLD AUTO: 1.68 K/UL (ref 1–4.8)
LYMPHOCYTES NFR BLD: 29.2 % (ref 22–41)
MCH RBC QN AUTO: 32.2 PG (ref 27–33)
MCHC RBC AUTO-ENTMCNC: 33.7 G/DL (ref 32.2–35.5)
MCV RBC AUTO: 95.7 FL (ref 81.4–97.8)
MONOCYTES # BLD AUTO: 0.48 K/UL (ref 0–0.85)
MONOCYTES NFR BLD AUTO: 8.3 % (ref 0–13.4)
NEUTROPHILS # BLD AUTO: 3.36 K/UL (ref 1.82–7.42)
NEUTROPHILS NFR BLD: 58.5 % (ref 44–72)
NRBC # BLD AUTO: 0 K/UL
NRBC BLD-RTO: 0 /100 WBC (ref 0–0.2)
PLATELET # BLD AUTO: 324 K/UL (ref 164–446)
PMV BLD AUTO: 10.4 FL (ref 9–12.9)
POTASSIUM SERPL-SCNC: 5 MMOL/L (ref 3.6–5.5)
PROT SERPL-MCNC: 7 G/DL (ref 6–8.2)
RBC # BLD AUTO: 4.84 M/UL (ref 4.2–5.4)
SODIUM SERPL-SCNC: 138 MMOL/L (ref 135–145)
TRIGL SERPL-MCNC: 183 MG/DL (ref 0–149)
TSH SERPL DL<=0.005 MIU/L-ACNC: 0.9 UIU/ML (ref 0.38–5.33)
WBC # BLD AUTO: 5.8 K/UL (ref 4.8–10.8)

## 2024-07-22 PROCEDURE — 86803 HEPATITIS C AB TEST: CPT

## 2024-07-22 PROCEDURE — 80053 COMPREHEN METABOLIC PANEL: CPT

## 2024-07-22 PROCEDURE — 84443 ASSAY THYROID STIM HORMONE: CPT

## 2024-07-22 PROCEDURE — 36415 COLL VENOUS BLD VENIPUNCTURE: CPT

## 2024-07-22 PROCEDURE — 80061 LIPID PANEL: CPT

## 2024-07-22 PROCEDURE — 85025 COMPLETE CBC W/AUTO DIFF WBC: CPT

## 2024-07-22 PROCEDURE — 87389 HIV-1 AG W/HIV-1&-2 AB AG IA: CPT

## 2024-07-22 RX ORDER — METFORMIN HYDROCHLORIDE 500 MG/1
500 TABLET, EXTENDED RELEASE ORAL
Qty: 90 TABLET | Refills: 0 | Status: SHIPPED | OUTPATIENT
Start: 2024-07-22

## 2024-07-25 DIAGNOSIS — R73.03 PREDIABETES: ICD-10-CM

## 2024-08-08 ENCOUNTER — APPOINTMENT (RX ONLY)
Dept: URBAN - NONMETROPOLITAN AREA CLINIC 15 | Facility: CLINIC | Age: 49
Setting detail: DERMATOLOGY
End: 2024-08-08

## 2024-08-08 DIAGNOSIS — L82.1 OTHER SEBORRHEIC KERATOSIS: ICD-10-CM

## 2024-08-08 DIAGNOSIS — L81.4 OTHER MELANIN HYPERPIGMENTATION: ICD-10-CM

## 2024-08-08 DIAGNOSIS — D22 MELANOCYTIC NEVI: ICD-10-CM

## 2024-08-08 DIAGNOSIS — D18.0 HEMANGIOMA: ICD-10-CM

## 2024-08-08 PROBLEM — D18.01 HEMANGIOMA OF SKIN AND SUBCUTANEOUS TISSUE: Status: ACTIVE | Noted: 2024-08-08

## 2024-08-08 PROBLEM — D22.5 MELANOCYTIC NEVI OF TRUNK: Status: ACTIVE | Noted: 2024-08-08

## 2024-08-08 PROBLEM — D22.61 MELANOCYTIC NEVI OF RIGHT UPPER LIMB, INCLUDING SHOULDER: Status: ACTIVE | Noted: 2024-08-08

## 2024-08-08 PROBLEM — D22.62 MELANOCYTIC NEVI OF LEFT UPPER LIMB, INCLUDING SHOULDER: Status: ACTIVE | Noted: 2024-08-08

## 2024-08-08 PROBLEM — D22.39 MELANOCYTIC NEVI OF OTHER PARTS OF FACE: Status: ACTIVE | Noted: 2024-08-08

## 2024-08-08 PROBLEM — D48.5 NEOPLASM OF UNCERTAIN BEHAVIOR OF SKIN: Status: ACTIVE | Noted: 2024-08-08

## 2024-08-08 PROBLEM — D22.72 MELANOCYTIC NEVI OF LEFT LOWER LIMB, INCLUDING HIP: Status: ACTIVE | Noted: 2024-08-08

## 2024-08-08 PROBLEM — D22.71 MELANOCYTIC NEVI OF RIGHT LOWER LIMB, INCLUDING HIP: Status: ACTIVE | Noted: 2024-08-08

## 2024-08-08 PROCEDURE — ? COUNSELING

## 2024-08-08 PROCEDURE — 11102 TANGNTL BX SKIN SINGLE LES: CPT

## 2024-08-08 PROCEDURE — 99213 OFFICE O/P EST LOW 20 MIN: CPT | Mod: 25

## 2024-08-08 PROCEDURE — ? BIOPSY BY SHAVE METHOD

## 2024-08-08 PROCEDURE — ? LIQUID NITROGEN

## 2024-08-08 ASSESSMENT — LOCATION DETAILED DESCRIPTION DERM
LOCATION DETAILED: LEFT PROXIMAL PRETIBIAL REGION
LOCATION DETAILED: RIGHT DISTAL POSTERIOR UPPER ARM
LOCATION DETAILED: RIGHT LATERAL SUPERIOR CHEST
LOCATION DETAILED: LEFT INFERIOR FOREHEAD
LOCATION DETAILED: RIGHT CENTRAL EYEBROW
LOCATION DETAILED: INFERIOR THORACIC SPINE
LOCATION DETAILED: RIGHT PROXIMAL DORSAL FOREARM
LOCATION DETAILED: LEFT VENTRAL LATERAL PROXIMAL FOREARM
LOCATION DETAILED: LEFT DISTAL POSTERIOR UPPER ARM
LOCATION DETAILED: MIDDLE STERNUM
LOCATION DETAILED: LEFT DISTAL DORSAL FOREARM
LOCATION DETAILED: RIGHT POPLITEAL SKIN
LOCATION DETAILED: RIGHT SUPERIOR UPPER BACK
LOCATION DETAILED: LEFT POPLITEAL SKIN
LOCATION DETAILED: LEFT ANTERIOR DISTAL THIGH
LOCATION DETAILED: SUBXIPHOID
LOCATION DETAILED: LEFT DISTAL POSTERIOR THIGH
LOCATION DETAILED: LOWER STERNUM
LOCATION DETAILED: RIGHT VENTRAL PROXIMAL FOREARM
LOCATION DETAILED: RIGHT ANTERIOR DISTAL THIGH
LOCATION DETAILED: LEFT SUPERIOR MEDIAL BUCCAL CHEEK
LOCATION DETAILED: RIGHT DISTAL POSTERIOR THIGH
LOCATION DETAILED: RIGHT PROXIMAL PRETIBIAL REGION
LOCATION DETAILED: LEFT LATERAL ELBOW
LOCATION DETAILED: RIGHT PROXIMAL POSTERIOR UPPER ARM

## 2024-08-08 ASSESSMENT — LOCATION SIMPLE DESCRIPTION DERM
LOCATION SIMPLE: RIGHT FOREARM
LOCATION SIMPLE: RIGHT POPLITEAL SKIN
LOCATION SIMPLE: LEFT POSTERIOR THIGH
LOCATION SIMPLE: LEFT PRETIBIAL REGION
LOCATION SIMPLE: LEFT ELBOW
LOCATION SIMPLE: RIGHT PRETIBIAL REGION
LOCATION SIMPLE: RIGHT THIGH
LOCATION SIMPLE: LEFT POSTERIOR UPPER ARM
LOCATION SIMPLE: ABDOMEN
LOCATION SIMPLE: LEFT FOREARM
LOCATION SIMPLE: LEFT FOREHEAD
LOCATION SIMPLE: UPPER BACK
LOCATION SIMPLE: RIGHT POSTERIOR UPPER ARM
LOCATION SIMPLE: RIGHT UPPER BACK
LOCATION SIMPLE: CHEST
LOCATION SIMPLE: LEFT THIGH
LOCATION SIMPLE: RIGHT EYEBROW
LOCATION SIMPLE: LEFT CHEEK
LOCATION SIMPLE: RIGHT POSTERIOR THIGH
LOCATION SIMPLE: LEFT POPLITEAL SKIN

## 2024-08-08 ASSESSMENT — LOCATION ZONE DERM
LOCATION ZONE: TRUNK
LOCATION ZONE: ARM
LOCATION ZONE: FACE
LOCATION ZONE: LEG

## 2024-08-08 NOTE — PROCEDURE: LIQUID NITROGEN
Add 52 Modifier (Optional): no
Detail Level: Detailed
Duration Of Freeze Thaw-Cycle (Seconds): 0
Medical Necessity Information: It is in your best interest to select a reason for this procedure from the list below. All of these items fulfill various CMS LCD requirements except the new and changing color options.
Medical Necessity Clause: This procedure was medically necessary because the lesions that were treated were:  If lesion does not resolve, bx is needed.
Show Spray Paint Technique Variable?: Yes
Post-Care Instructions: I reviewed with the patient in detail post-care instructions. Patient is to wear sunprotection, and avoid picking at any of the treated lesions. Pt may apply Vaseline to crusted or scabbing areas.
Consent: The patient's consent was obtained including but not limited to risks of crusting, scabbing, blistering, scarring, darker or lighter pigmentary change, recurrence, incomplete removal and infection.
Spray Paint Text: The liquid nitrogen was applied to the skin utilizing a spray paint frosting technique.

## 2024-08-08 NOTE — PROCEDURE: BIOPSY BY SHAVE METHOD
Detail Level: Detailed
Depth Of Biopsy: dermis
Was A Bandage Applied: Yes
Size Of Lesion In Cm: 0.4
X Size Of Lesion In Cm: 0
Biopsy Type: H and E
Biopsy Method: Personna blade
Anesthesia Type: 1% lidocaine without epinephrine and a 1:10 solution of 8.4% sodium bicarbonate
Anesthesia Volume In Cc: 1.5
Hemostasis: Electrocautery
Wound Care: Vaseline
Dressing: Band-Aid
Destruction After The Procedure: No
Type Of Destruction Used: Electrodesiccation
Curettage Text: The wound bed was treated with curettage after the biopsy was performed.
Cryotherapy Text: The wound bed was treated with cryotherapy after the biopsy was performed.
Electrodesiccation Text: The wound bed was treated with electrodesiccation after the biopsy was performed.
Electrodesiccation And Curettage Text: The wound bed was treated with electrodesiccation and curettage after the biopsy was performed.
Silver Nitrate Text: The wound bed was treated with silver nitrate after the biopsy was performed.
Lab: [id number]
Lab Facility: 
Consent: Written consent was obtained and risks were reviewed including but not limited to scarring, infection, bleeding, scabbing, incomplete removal, nerve damage and allergy to anesthesia.
Post-Care Instructions: I reviewed with the patient in detail post-care instructions. Patient is to keep the biopsy site dry overnight, and then apply bacitracin/petroleum  twice daily until healed. Patient may apply hydrogen peroxide soaks to remove any crusting.
Notification Instructions: Patient will be notified of biopsy results. However, patient instructed to call the office if not contacted within 2 weeks.
Billing Type: Third-Party Bill
Information: Selecting Yes will display possible errors in your note based on the variables you have selected. This validation is only offered as a suggestion for you. PLEASE NOTE THAT THE VALIDATION TEXT WILL BE REMOVED WHEN YOU FINALIZE YOUR NOTE. IF YOU WANT TO FAX A PRELIMINARY NOTE YOU WILL NEED TO TOGGLE THIS TO 'NO' IF YOU DO NOT WANT IT IN YOUR FAXED NOTE.

## 2024-09-05 ENCOUNTER — APPOINTMENT (RX ONLY)
Dept: URBAN - NONMETROPOLITAN AREA CLINIC 15 | Facility: CLINIC | Age: 49
Setting detail: DERMATOLOGY
End: 2024-09-05

## 2024-09-05 PROBLEM — C44.319 BASAL CELL CARCINOMA OF SKIN OF OTHER PARTS OF FACE: Status: ACTIVE | Noted: 2024-09-05

## 2024-09-05 PROCEDURE — 11900 INJECT SKIN LESIONS </W 7: CPT

## 2024-09-05 PROCEDURE — ? BLEOMYCIN

## 2024-09-05 NOTE — PROCEDURE: BLEOMYCIN
Anesthesia Type: 1% lidocaine with epinephrine
Total Volume (Ccs): 0.5
Lot # (Optional): [id number]
Detail Level: Detailed
Bill J-Code: yes
Bill For Wasted Drug?: no
Method Of Treatment: injection
Post-Care Instructions: I reviewed with the patient in detail post-care instructions. The patient understands that the treated areas should be washed off 6 to 8 hours after application.
Concentration (Units/Cc): 1
Expiration Date (Optional): 10/2024
Consent: The patient's consent was obtained including but not limited to risks of crusting, scabbing, scarring, blistering, flagellate hyperpigmentation, local vasospasm, darker or lighter pigmentary change, recurrence, incomplete removal and infection.

## 2024-10-01 ENCOUNTER — APPOINTMENT (OUTPATIENT)
Dept: MEDICAL GROUP | Facility: PHYSICIAN GROUP | Age: 49
End: 2024-10-01
Payer: COMMERCIAL

## 2024-10-01 VITALS
WEIGHT: 226.4 LBS | RESPIRATION RATE: 16 BRPM | OXYGEN SATURATION: 99 % | HEART RATE: 80 BPM | HEIGHT: 63 IN | SYSTOLIC BLOOD PRESSURE: 104 MMHG | TEMPERATURE: 98.6 F | DIASTOLIC BLOOD PRESSURE: 104 MMHG | BODY MASS INDEX: 40.11 KG/M2

## 2024-10-01 DIAGNOSIS — R20.0 LEG NUMBNESS: ICD-10-CM

## 2024-10-01 DIAGNOSIS — R41.3 MEMORY CHANGE: ICD-10-CM

## 2024-10-01 DIAGNOSIS — R03.0 ELEVATED BLOOD PRESSURE READING: ICD-10-CM

## 2024-10-01 DIAGNOSIS — E11.8 CONTROLLED TYPE 2 DIABETES MELLITUS WITH COMPLICATION, WITHOUT LONG-TERM CURRENT USE OF INSULIN (HCC): ICD-10-CM

## 2024-10-01 DIAGNOSIS — R53.83 OTHER FATIGUE: ICD-10-CM

## 2024-10-01 DIAGNOSIS — E03.9 HYPOTHYROIDISM, UNSPECIFIED TYPE: ICD-10-CM

## 2024-10-01 DIAGNOSIS — E11.69 DYSLIPIDEMIA ASSOCIATED WITH TYPE 2 DIABETES MELLITUS (HCC): ICD-10-CM

## 2024-10-01 DIAGNOSIS — E78.5 DYSLIPIDEMIA ASSOCIATED WITH TYPE 2 DIABETES MELLITUS (HCC): ICD-10-CM

## 2024-10-01 PROBLEM — D75.1 POLYCYTHEMIA: Status: RESOLVED | Noted: 2023-04-20 | Resolved: 2024-10-01

## 2024-10-01 LAB
HBA1C MFR BLD: 6.5 % (ref ?–5.8)
POCT INT CON NEG: NEGATIVE
POCT INT CON POS: POSITIVE

## 2024-10-01 PROCEDURE — 99214 OFFICE O/P EST MOD 30 MIN: CPT | Performed by: PHYSICIAN ASSISTANT

## 2024-10-01 PROCEDURE — 3080F DIAST BP >= 90 MM HG: CPT | Performed by: PHYSICIAN ASSISTANT

## 2024-10-01 PROCEDURE — 92250 FUNDUS PHOTOGRAPHY W/I&R: CPT | Mod: 26 | Performed by: PHYSICIAN ASSISTANT

## 2024-10-01 PROCEDURE — 83036 HEMOGLOBIN GLYCOSYLATED A1C: CPT | Performed by: PHYSICIAN ASSISTANT

## 2024-10-01 PROCEDURE — 3074F SYST BP LT 130 MM HG: CPT | Performed by: PHYSICIAN ASSISTANT

## 2024-10-01 RX ORDER — LEVOTHYROXINE SODIUM 75 UG/1
75 TABLET ORAL
Qty: 90 TABLET | Refills: 3 | Status: SHIPPED | OUTPATIENT
Start: 2024-10-01

## 2024-10-01 RX ORDER — NORETHINDRONE ACETATE AND ETHINYL ESTRADIOL AND FERROUS FUMARATE 1MG-20(24)
KIT ORAL
COMMUNITY
Start: 2024-07-09

## 2024-10-01 RX ORDER — ALPRAZOLAM 0.25 MG/1
TABLET ORAL
COMMUNITY
Start: 2023-12-14

## 2024-10-01 ASSESSMENT — FIBROSIS 4 INDEX: FIB4 SCORE: 0.63

## 2024-10-01 ASSESSMENT — ENCOUNTER SYMPTOMS
SHORTNESS OF BREATH: 0
CHILLS: 0
FEVER: 0

## 2024-10-03 ENCOUNTER — APPOINTMENT (RX ONLY)
Dept: URBAN - NONMETROPOLITAN AREA CLINIC 15 | Facility: CLINIC | Age: 49
Setting detail: DERMATOLOGY
End: 2024-10-03

## 2024-10-03 PROBLEM — C44.319 BASAL CELL CARCINOMA OF SKIN OF OTHER PARTS OF FACE: Status: ACTIVE | Noted: 2024-10-03

## 2024-10-03 PROCEDURE — ? BLEOMYCIN

## 2024-10-03 PROCEDURE — 11900 INJECT SKIN LESIONS </W 7: CPT

## 2024-10-03 NOTE — PROCEDURE: BLEOMYCIN
Anesthesia Type: 1% lidocaine with epinephrine
Total Volume (Ccs): 0.4
Lot # (Optional): [id number]
Detail Level: Detailed
Bill J-Code: yes
Bill For Wasted Drug?: no
Method Of Treatment: injection
Post-Care Instructions: I reviewed with the patient in detail post-care instructions. The patient understands that the treated areas should be washed off 6 to 8 hours after application.
Concentration (Units/Cc): 1
Expiration Date (Optional): 9/2025
Consent: The patient's consent was obtained including but not limited to risks of crusting, scabbing, scarring, blistering, flagellate hyperpigmentation, local vasospasm, darker or lighter pigmentary change, recurrence, incomplete removal and infection.

## 2024-10-16 ENCOUNTER — TELEPHONE (OUTPATIENT)
Dept: MEDICAL GROUP | Facility: PHYSICIAN GROUP | Age: 49
End: 2024-10-16
Payer: COMMERCIAL

## 2024-10-18 ENCOUNTER — TELEPHONE (OUTPATIENT)
Dept: MEDICAL GROUP | Facility: PHYSICIAN GROUP | Age: 49
End: 2024-10-18
Payer: COMMERCIAL

## 2024-10-24 RX ORDER — METFORMIN HYDROCHLORIDE 500 MG/1
500 TABLET, EXTENDED RELEASE ORAL
Qty: 90 TABLET | Refills: 0 | Status: SHIPPED | OUTPATIENT
Start: 2024-10-24

## 2024-10-29 LAB — RETINAL SCREEN: NEGATIVE

## 2024-12-20 DIAGNOSIS — E11.8 CONTROLLED TYPE 2 DIABETES MELLITUS WITH COMPLICATION, WITHOUT LONG-TERM CURRENT USE OF INSULIN (HCC): ICD-10-CM

## 2024-12-20 RX ORDER — TIRZEPATIDE 2.5 MG/.5ML
2.5 INJECTION, SOLUTION SUBCUTANEOUS
Qty: 6 ML | Refills: 0 | Status: SHIPPED | OUTPATIENT
Start: 2024-12-20

## 2024-12-20 NOTE — TELEPHONE ENCOUNTER
Received request via: Patient    Was the patient seen in the last year in this department? Yes    Does the patient have an active prescription (recently filled or refills available) for medication(s) requested? No    Does the patient have [facility] Plus and need 100-day supply? (This applies to ALL medications) Patient does not have SCP

## 2025-01-23 RX ORDER — TIRZEPATIDE 5 MG/.5ML
5 INJECTION, SOLUTION SUBCUTANEOUS
Qty: 2 ML | Refills: 2 | Status: SHIPPED | OUTPATIENT
Start: 2025-01-23

## 2025-02-06 ENCOUNTER — APPOINTMENT (OUTPATIENT)
Dept: URBAN - NONMETROPOLITAN AREA CLINIC 15 | Facility: CLINIC | Age: 50
Setting detail: DERMATOLOGY
End: 2025-02-06

## 2025-02-06 DIAGNOSIS — Z85.828 PERSONAL HISTORY OF OTHER MALIGNANT NEOPLASM OF SKIN: ICD-10-CM

## 2025-02-06 DIAGNOSIS — L71.8 OTHER ROSACEA: ICD-10-CM

## 2025-02-06 PROBLEM — D23.39 OTHER BENIGN NEOPLASM OF SKIN OF OTHER PARTS OF FACE: Status: ACTIVE | Noted: 2025-02-06

## 2025-02-06 PROCEDURE — ? CONSULTATION EXCISION

## 2025-02-06 PROCEDURE — ? COUNSELING

## 2025-02-06 PROCEDURE — 99213 OFFICE O/P EST LOW 20 MIN: CPT

## 2025-02-06 PROCEDURE — ? ADDITIONAL NOTES

## 2025-02-06 PROCEDURE — ? OBSERVATION

## 2025-02-06 ASSESSMENT — LOCATION ZONE DERM: LOCATION ZONE: FACE

## 2025-02-06 ASSESSMENT — LOCATION SIMPLE DESCRIPTION DERM
LOCATION SIMPLE: LEFT CHEEK
LOCATION SIMPLE: RIGHT CHEEK

## 2025-02-06 ASSESSMENT — LOCATION DETAILED DESCRIPTION DERM
LOCATION DETAILED: RIGHT INFERIOR CENTRAL MALAR CHEEK
LOCATION DETAILED: LEFT INFERIOR CENTRAL MALAR CHEEK
LOCATION DETAILED: RIGHT INFERIOR LATERAL MALAR CHEEK

## 2025-02-06 NOTE — PROCEDURE: ADDITIONAL NOTES
Additional Notes: Recommended Stridex pads.
Render Risk Assessment In Note?: no
Detail Level: Zone
Additional Notes: Gave pt handout for Johana.

## 2025-02-11 ENCOUNTER — APPOINTMENT (OUTPATIENT)
Dept: MEDICAL GROUP | Facility: PHYSICIAN GROUP | Age: 50
End: 2025-02-11
Payer: COMMERCIAL

## 2025-02-11 VITALS
HEIGHT: 63 IN | SYSTOLIC BLOOD PRESSURE: 132 MMHG | DIASTOLIC BLOOD PRESSURE: 88 MMHG | HEART RATE: 78 BPM | WEIGHT: 211.7 LBS | BODY MASS INDEX: 37.51 KG/M2 | RESPIRATION RATE: 16 BRPM | OXYGEN SATURATION: 97 % | TEMPERATURE: 98.9 F

## 2025-02-11 DIAGNOSIS — E03.9 HYPOTHYROIDISM, UNSPECIFIED TYPE: ICD-10-CM

## 2025-02-11 DIAGNOSIS — E11.8 CONTROLLED TYPE 2 DIABETES MELLITUS WITH COMPLICATION, WITHOUT LONG-TERM CURRENT USE OF INSULIN (HCC): ICD-10-CM

## 2025-02-11 DIAGNOSIS — R03.0 ELEVATED BLOOD PRESSURE READING: ICD-10-CM

## 2025-02-11 DIAGNOSIS — G47.33 OSA (OBSTRUCTIVE SLEEP APNEA): ICD-10-CM

## 2025-02-11 DIAGNOSIS — R41.3 MEMORY CHANGE: ICD-10-CM

## 2025-02-11 DIAGNOSIS — E78.5 DYSLIPIDEMIA ASSOCIATED WITH TYPE 2 DIABETES MELLITUS (HCC): ICD-10-CM

## 2025-02-11 DIAGNOSIS — E66.3 OVERWEIGHT: ICD-10-CM

## 2025-02-11 DIAGNOSIS — Z12.31 ENCOUNTER FOR SCREENING MAMMOGRAM FOR MALIGNANT NEOPLASM OF BREAST: ICD-10-CM

## 2025-02-11 DIAGNOSIS — E55.9 VITAMIN D DEFICIENCY: ICD-10-CM

## 2025-02-11 DIAGNOSIS — E11.69 DYSLIPIDEMIA ASSOCIATED WITH TYPE 2 DIABETES MELLITUS (HCC): ICD-10-CM

## 2025-02-11 DIAGNOSIS — F41.8 DEPRESSION WITH ANXIETY: ICD-10-CM

## 2025-02-11 LAB
HBA1C MFR BLD: 5.6 % (ref ?–5.8)
POCT INT CON NEG: NEGATIVE
POCT INT CON POS: POSITIVE

## 2025-02-11 PROCEDURE — 3079F DIAST BP 80-89 MM HG: CPT | Performed by: PHYSICIAN ASSISTANT

## 2025-02-11 PROCEDURE — 3075F SYST BP GE 130 - 139MM HG: CPT | Performed by: PHYSICIAN ASSISTANT

## 2025-02-11 PROCEDURE — 99215 OFFICE O/P EST HI 40 MIN: CPT | Performed by: PHYSICIAN ASSISTANT

## 2025-02-11 PROCEDURE — 83036 HEMOGLOBIN GLYCOSYLATED A1C: CPT | Performed by: PHYSICIAN ASSISTANT

## 2025-02-11 RX ORDER — CITALOPRAM HYDROBROMIDE 40 MG/1
40 TABLET ORAL
COMMUNITY
Start: 2025-01-16

## 2025-02-11 RX ORDER — PROPRANOLOL HYDROCHLORIDE 10 MG/1
10-20 TABLET ORAL 3 TIMES DAILY PRN
Qty: 90 TABLET | Refills: 1 | Status: SHIPPED | OUTPATIENT
Start: 2025-02-11

## 2025-02-11 RX ORDER — ACYCLOVIR 400 MG/1
TABLET ORAL
COMMUNITY
Start: 2024-11-26

## 2025-02-11 RX ORDER — ROSUVASTATIN CALCIUM 5 MG/1
5 TABLET, COATED ORAL EVERY EVENING
Qty: 90 TABLET | Refills: 1 | Status: SHIPPED | OUTPATIENT
Start: 2025-02-11

## 2025-02-11 RX ORDER — TIRZEPATIDE 5 MG/.5ML
5 INJECTION, SOLUTION SUBCUTANEOUS
Qty: 2 ML | Refills: 2 | Status: SHIPPED | OUTPATIENT
Start: 2025-02-11

## 2025-02-11 RX ORDER — LOSARTAN POTASSIUM 25 MG/1
25 TABLET ORAL DAILY
Qty: 10090 TABLET | Refills: 1 | Status: SHIPPED | OUTPATIENT
Start: 2025-02-11

## 2025-02-11 ASSESSMENT — ENCOUNTER SYMPTOMS
SHORTNESS OF BREATH: 0
CHILLS: 0
FEVER: 0

## 2025-02-11 ASSESSMENT — FIBROSIS 4 INDEX: FIB4 SCORE: 0.63

## 2025-02-11 NOTE — ASSESSMENT & PLAN NOTE
Chronic, uncontrolled.    Latest Labs:   Lab Results   Component Value Date/Time    CHOLSTRLTOT 206 (H) 07/22/2024 08:34 AM     (H) 07/22/2024 08:34 AM    HDL 14 (A) 07/22/2024 08:34 AM    TRIGLYCERIDE 183 (H) 07/22/2024 08:34 AM      Medications: adding rosuvastatin 5mg (2/2025)    Risk calculator: The ASCVD Risk score (Karsten DORANTES, et al., 2019) failed to calculate.     Labs 11/2022, [place]:    HDL 37  Triglycerides 42

## 2025-02-11 NOTE — ASSESSMENT & PLAN NOTE
3/6/2024 pulmonology evaluation, telemedicine:  1. LEONARD (obstructive sleep apnea)  - DME Mask and Supplies        Has been advised to continue the current APAP 5-15 CWP, clean equipment frequently, and get new mask and supplies as allowed by insurance and DME. Advised about potential problems including nasal obstruction/stuffiness, mask leak or discomfort , frequent awakenings, chill or dryness of the upper airway, noise, inconvenience, and lack of benefit. Recommend an earlier appointment, if significant treatment barriers develop.        The risks of untreated sleep apnea were discussed with the patient at length. Patients with LEONARD are at increased risk of cardiovascular disease including coronary artery disease, systemic arterial hypertension, pulmonary arterial hypertension, cardiac arrythmias, and stroke. LEONARD patients have an increased risk of motor vehicle accidents, type 2 diabetes, chronic kidney disease, and non-alcoholic liver disease. The patient was advised to avoid driving a motor vehicle when drowsy.     Positive airway pressure will favorably impact many of the adverse conditions and effects provoked by LEONARD.     Have advised the patient to follow up with the appropriate healthcare practitioners for all other medical problems and issues.    Time spent 25 minutes. More than 1/2 the time spent coordinating care, counseling the patient, and reviewing the pathophysiology and treatment options for sleep apnea and the patient specific modifying factors and significant co-morbidities.        Follow-up:  Return in about 1 year (around 3/6/2025).

## 2025-02-11 NOTE — ASSESSMENT & PLAN NOTE
Chronic, controlled.  New diagnosis 10/1/2024.    Current Diabetes Medication Regimen  Mounjaro 5 mg every 7 days    Previous Diabetes Medications and Reason for Discontinuation  Metformin (asymptomatic lows, 20s; A1C improved)    A1c: 5.6 (2/2025); 6.5 (10/1/2024)  Microalb/Cr ratio:   Fasting sugars:   Diabetic foot exam: 10/1/2024  Retinal eye exam: 10/1/2024  ACEi/ARB: adding losartan 25mg (2/2025)  Statin: adding rosuvastatin 5mg (2/2025)  Aspirin: recommend adding 81mg  Concomitant HTN:  Nightly foot checks:

## 2025-02-11 NOTE — ASSESSMENT & PLAN NOTE
Chronic, ongoing.  Anxiety/depression  Xanax did help with anxiety/calm but was causing memory issues.  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    Tried: alprazolam (memory issues); gabapentin (tired); sertraline (?didn't help)

## 2025-02-11 NOTE — PROGRESS NOTES
SUBJECTIVE:     CC: Follow-up on chronic issues    HPI:   Lisa presents today with the following:    ASSESSMENT & PLAN by Problem:     Problem   Overweight    Chronic, uncontrolled.  Improving.  On mounjaro.  Increasing dose to 5 mg (2/11/2025).     Depression With Anxiety    Chronic, ongoing.  Continue citalopram 40 mg daily.  Adding propranolol as needed.  Patient will let me know how she was doing on the medications and if she wants to make any changes     Memory Change    Chronic, ongoing.  Patient continues to have memory issues but declines further workup at this time.     Elevated Blood Pressure Reading    Chronic, intermittent.  Given diagnosis of diabetes, adding losartan 25 mg daily.     Tanmay (Obstructive Sleep Apnea)    Chronic, ongoing.  Was using CPAP but does not sleep well with it.  Recently stopped using it given her recent weight loss.  Will reevaluate the need for a repeat home sleep study around February 2026.     Controlled Type 2 Diabetes Mellitus With Complication, Without Long-Term Current Use of Insulin (Hcc)    Chronic, controlled.  New diagnosis 10/1/2024.    Current Diabetes Medication Regimen  Mounjaro 5 mg every 7 days     Dyslipidemia Associated With Type 2 Diabetes Mellitus (Hcc)    Chronic, uncontrolled.  Increase plant-based foods, decrease animal-based foods.  Increase daily activity, aiming for 30-45 minutes brisk exercise daily.    adding rosuvastatin 5mg (2/2025)     Hypothyroidism    Chronic, stable.  Tolerating/continue levothyroxine 75 mcg daily.        Mammogram: ordered today    POC A1c: 5.6%  ACE/ARB: adding losartan 25mg daily  Statin: adding rosuvastatin 5mg daily  ASA 81 mg: recommend adding     Repeat labs August 2025.  Adding rosuvastatin 5mg daily.  She will repeat at [facility].  Will MyChart patient with results.  Follow-up in clinic around February 2026.    Return in about 1 year (around 2/11/2026), or if symptoms worsen or fail to improve, for lab  discussion.        HPI:     Problem List Items Addressed This Visit       Controlled type 2 diabetes mellitus with complication, without long-term current use of insulin (HCC)     Chronic, controlled.  New diagnosis 10/1/2024.    Current Diabetes Medication Regimen  Mounjaro 5 mg every 7 days    Previous Diabetes Medications and Reason for Discontinuation  Metformin (asymptomatic lows, 20s; A1C improved)    A1c: 5.6 (2/2025); 6.5 (10/1/2024)  Microalb/Cr ratio:   Fasting sugars:   Diabetic foot exam: 10/1/2024  Retinal eye exam: 10/1/2024  ACEi/ARB: adding losartan 25mg (2/2025)  Statin: adding rosuvastatin 5mg (2/2025)  Aspirin: recommend adding 81mg  Concomitant HTN:  Nightly foot checks:            Relevant Medications    Tirzepatide (MOUNJARO) 5 MG/0.5ML Solution Auto-injector    losartan (COZAAR) 25 MG Tab    rosuvastatin (CRESTOR) 5 MG Tab    Other Relevant Orders    POCT  A1C (Completed)    HEMOGLOBIN A1C    MICROALBUMIN CREAT RATIO URINE    Depression with anxiety     Chronic, ongoing.  Anxiety/depression  Xanax did help with anxiety/calm but was causing memory issues.  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    Tried: alprazolam (memory issues); gabapentin (tired); sertraline (?didn't help)         Relevant Medications    citalopram (CELEXA) 40 MG Tab    propranolol (INDERAL) 10 MG Tab    Dyslipidemia associated with type 2 diabetes mellitus (HCC)     Chronic, uncontrolled.    Latest Labs:   Lab Results   Component Value Date/Time    CHOLSTRLTOT 206 (H) 07/22/2024 08:34 AM     (H) 07/22/2024 08:34 AM    HDL 14 (A) 07/22/2024 08:34 AM    TRIGLYCERIDE 183 (H) 07/22/2024 08:34 AM      Medications: adding rosuvastatin 5mg (2/2025)    Risk calculator: The ASCVD Risk score (Karsten DORANTES, et al., 2019) failed to calculate.     Labs 11/2022, [place]:    HDL 37  Triglycerides 42            Relevant Medications    Tirzepatide (MOUNJARO) 5 MG/0.5ML Solution Auto-injector    rosuvastatin (CRESTOR) 5 MG Tab    Other Relevant Orders    Lipid Profile    Elevated blood pressure reading    Relevant Medications    losartan (COZAAR) 25 MG Tab    Hypothyroidism     Chronic, stable.  Tolerating/continue levothyroxine 75 mcg daily.          Relevant Orders    TSH WITH REFLEX TO FT4    Memory change    LEONARD (obstructive sleep apnea)       3/6/2024 pulmonology evaluation, telemedicine:  1. LEONARD (obstructive sleep apnea)  - DME Mask and Supplies        Has been advised to continue the current APAP 5-15 CWP, clean equipment frequently, and get new mask and supplies as allowed by insurance and DME. Advised about potential problems including nasal obstruction/stuffiness, mask leak or discomfort , frequent awakenings, chill or dryness of the upper airway, noise, inconvenience, and lack of benefit. Recommend an earlier appointment, if significant treatment barriers develop.        The risks of untreated sleep apnea were discussed with the patient at length. Patients with LEONARD are at increased risk of cardiovascular disease including coronary artery disease, systemic arterial hypertension, pulmonary arterial hypertension, cardiac arrythmias, and stroke. LEONARD patients have an increased risk of motor vehicle accidents, type 2 diabetes, chronic kidney disease, and non-alcoholic liver disease. The patient was advised to avoid driving a motor vehicle when drowsy.     Positive airway pressure will favorably impact many of the adverse conditions and effects provoked by LEONARD.     Have advised the patient to follow up with the appropriate healthcare practitioners for all other medical problems and issues.    Time spent 25 minutes. More than 1/2 the time spent coordinating care, counseling the patient, and reviewing the pathophysiology and treatment options for sleep apnea and the patient specific modifying factors and significant  "co-morbidities.        Follow-up:  Return in about 1 year (around 3/6/2025).         Overweight     Chronic, uncontrolled.  Improving.    2/2025: 211#  10/2024: 226#  4/20223: 215#         Relevant Orders    CBC WITH DIFFERENTIAL    Comp Metabolic Panel     Other Visit Diagnoses       Vitamin D deficiency        Relevant Orders    VITAMIN D,25 HYDROXY (DEFICIENCY)    Encounter for screening mammogram for malignant neoplasm of breast        Relevant Orders    MA-SCREENING MAMMO BILAT W/TOMOSYNTHESIS W/CAD                   ROS:  Review of Systems   Constitutional:  Negative for chills and fever.   Respiratory:  Negative for shortness of breath.    Cardiovascular:  Negative for chest pain.       OBJECTIVE:     Exam:  /88 (BP Location: Left arm, Patient Position: Sitting, BP Cuff Size: Large adult)   Pulse 78   Temp 37.2 °C (98.9 °F) (Temporal)   Resp 16   Ht 1.6 m (5' 3\")   Wt 96 kg (211 lb 11.2 oz)   LMP 01/28/2025 (Approximate)   SpO2 97%   BMI 37.50 kg/m²  Body mass index is 37.5 kg/m².    Physical Exam  Vitals reviewed.   Constitutional:       General: She is not in acute distress.     Appearance: Normal appearance.   Pulmonary:      Effort: Pulmonary effort is normal.   Neurological:      General: No focal deficit present.      Mental Status: She is alert.   Psychiatric:         Mood and Affect: Mood normal.         Behavior: Behavior normal.         Judgment: Judgment normal.                  Time: 53 minutes in total spent on patient care including pre-charting, record review, documentation and, at times, discussion with healthcare staff.  This includes face-to-face time for exam, review, discussion, as well as counseling and coordinating care.         Please note that this dictation was created using voice recognition software. I have made every reasonable attempt to correct obvious errors, but I expect that there are errors of grammar and possibly content that I did not discover before " finalizing the note.

## 2025-02-27 ENCOUNTER — APPOINTMENT (OUTPATIENT)
Dept: RADIOLOGY | Facility: MEDICAL CENTER | Age: 50
End: 2025-02-27
Attending: PHYSICIAN ASSISTANT
Payer: COMMERCIAL

## 2025-02-27 RX ORDER — BUPROPION HYDROCHLORIDE 75 MG/1
75 TABLET ORAL 2 TIMES DAILY
Qty: 180 TABLET | Refills: 0 | Status: SHIPPED | OUTPATIENT
Start: 2025-02-27

## 2025-03-03 DIAGNOSIS — F41.8 DEPRESSION WITH ANXIETY: ICD-10-CM

## 2025-03-03 DIAGNOSIS — R03.0 ELEVATED BLOOD PRESSURE READING: ICD-10-CM

## 2025-03-03 RX ORDER — LISINOPRIL 10 MG/1
10 TABLET ORAL DAILY
Qty: 100 TABLET | Refills: 3 | Status: SHIPPED | OUTPATIENT
Start: 2025-03-03 | End: 2026-04-07

## 2025-03-03 RX ORDER — CITALOPRAM HYDROBROMIDE 20 MG/1
20 TABLET ORAL DAILY
Qty: 90 TABLET | Refills: 0 | Status: SHIPPED | OUTPATIENT
Start: 2025-03-03

## 2025-03-11 ENCOUNTER — HOSPITAL ENCOUNTER (OUTPATIENT)
Dept: RADIOLOGY | Facility: MEDICAL CENTER | Age: 50
End: 2025-03-11
Attending: PHYSICIAN ASSISTANT
Payer: COMMERCIAL

## 2025-03-11 DIAGNOSIS — Z12.31 ENCOUNTER FOR SCREENING MAMMOGRAM FOR MALIGNANT NEOPLASM OF BREAST: ICD-10-CM

## 2025-03-11 PROCEDURE — 77067 SCR MAMMO BI INCL CAD: CPT

## 2025-03-12 ENCOUNTER — RESULTS FOLLOW-UP (OUTPATIENT)
Dept: MEDICAL GROUP | Facility: PHYSICIAN GROUP | Age: 50
End: 2025-03-12
Payer: COMMERCIAL

## 2025-04-10 DIAGNOSIS — F41.8 DEPRESSION WITH ANXIETY: ICD-10-CM

## 2025-04-10 RX ORDER — BUPROPION HYDROCHLORIDE 150 MG/1
150 TABLET ORAL EVERY MORNING
Qty: 90 TABLET | Refills: 3 | Status: SHIPPED | OUTPATIENT
Start: 2025-04-10

## 2025-04-28 ENCOUNTER — HOSPITAL ENCOUNTER (OUTPATIENT)
Dept: LAB | Facility: MEDICAL CENTER | Age: 50
End: 2025-04-28
Payer: COMMERCIAL

## 2025-04-28 PROCEDURE — 82607 VITAMIN B-12: CPT

## 2025-04-28 PROCEDURE — 84439 ASSAY OF FREE THYROXINE: CPT

## 2025-04-28 PROCEDURE — 84270 ASSAY OF SEX HORMONE GLOBUL: CPT

## 2025-04-28 PROCEDURE — 84443 ASSAY THYROID STIM HORMONE: CPT

## 2025-04-28 PROCEDURE — 84481 FREE ASSAY (FT-3): CPT

## 2025-04-28 PROCEDURE — 36415 COLL VENOUS BLD VENIPUNCTURE: CPT

## 2025-04-28 PROCEDURE — 83002 ASSAY OF GONADOTROPIN (LH): CPT

## 2025-04-28 PROCEDURE — 82306 VITAMIN D 25 HYDROXY: CPT

## 2025-04-28 PROCEDURE — 86376 MICROSOMAL ANTIBODY EACH: CPT

## 2025-04-28 PROCEDURE — 83001 ASSAY OF GONADOTROPIN (FSH): CPT

## 2025-04-28 PROCEDURE — 84403 ASSAY OF TOTAL TESTOSTERONE: CPT

## 2025-04-28 PROCEDURE — 82670 ASSAY OF TOTAL ESTRADIOL: CPT

## 2025-04-29 LAB
25(OH)D3 SERPL-MCNC: 41 NG/ML (ref 30–100)
ESTRADIOL SERPL-MCNC: 16.6 PG/ML
FSH SERPL-ACNC: 12.8 MIU/ML
LH SERPL-ACNC: 8.1 IU/L
T3FREE SERPL-MCNC: 3.68 PG/ML (ref 2–4.4)
T4 FREE SERPL-MCNC: 1.67 NG/DL (ref 0.93–1.7)
THYROPEROXIDASE AB SERPL-ACNC: <9 IU/ML (ref 0–9)
TSH SERPL-ACNC: 0.9 UIU/ML (ref 0.38–5.33)
VIT B12 SERPL-MCNC: 441 PG/ML (ref 211–911)

## 2025-05-01 LAB — SHBG SERPL-SCNC: 90 NMOL/L (ref 17–125)

## 2025-05-04 LAB — TESTOST SERPL-MCNC: 15 NG/DL (ref 9–55)

## 2025-05-07 DIAGNOSIS — E11.8 CONTROLLED TYPE 2 DIABETES MELLITUS WITH COMPLICATION, WITHOUT LONG-TERM CURRENT USE OF INSULIN (HCC): ICD-10-CM

## 2025-05-07 NOTE — TELEPHONE ENCOUNTER
Received request via: Patient    Was the patient seen in the last year in this department? Yes    Does the patient have an active prescription (recently filled or refills available) for medication(s) requested? No    [facility]    Does the patient have [facility] home [facility] and need 100-day supply? (This applies to ALL medications) Patient does not have SCP

## 2025-05-08 RX ORDER — TIRZEPATIDE 5 MG/.5ML
5 INJECTION, SOLUTION SUBCUTANEOUS
Qty: 6 ML | Refills: 1 | Status: SHIPPED | OUTPATIENT
Start: 2025-05-08

## 2025-05-21 DIAGNOSIS — E03.9 HYPOTHYROIDISM, UNSPECIFIED TYPE: ICD-10-CM

## 2025-05-21 RX ORDER — CITALOPRAM HYDROBROMIDE 20 MG/1
20 TABLET ORAL DAILY
Qty: 90 TABLET | Refills: 1 | Status: SHIPPED | OUTPATIENT
Start: 2025-05-21

## 2025-05-21 RX ORDER — LISINOPRIL 10 MG/1
10 TABLET ORAL DAILY
Qty: 100 TABLET | Refills: 1 | Status: SHIPPED | OUTPATIENT
Start: 2025-05-21 | End: 2026-06-25

## 2025-05-21 RX ORDER — BUPROPION HYDROCHLORIDE 150 MG/1
150 TABLET ORAL EVERY MORNING
Qty: 90 TABLET | Refills: 1 | Status: SHIPPED | OUTPATIENT
Start: 2025-05-21

## 2025-05-21 RX ORDER — LEVOTHYROXINE SODIUM 75 UG/1
75 TABLET ORAL
Qty: 90 TABLET | Refills: 1 | Status: SHIPPED | OUTPATIENT
Start: 2025-05-21

## 2025-05-21 NOTE — TELEPHONE ENCOUNTER
Received request via: Pharmacy    Was the patient seen in the last year in this department? Yes    Does the patient have an active prescription (recently filled or refills available) for medication(s) requested? No    [facility]    Does the patient have group home Plus and need 100-day supply? (This applies to ALL medications) Patient does not have SCP

## 2025-08-19 ENCOUNTER — APPOINTMENT (OUTPATIENT)
Dept: URBAN - NONMETROPOLITAN AREA CLINIC 15 | Facility: CLINIC | Age: 50
Setting detail: DERMATOLOGY
End: 2025-08-19

## 2025-08-19 DIAGNOSIS — L82.1 OTHER SEBORRHEIC KERATOSIS: ICD-10-CM

## 2025-08-19 DIAGNOSIS — D22 MELANOCYTIC NEVI: ICD-10-CM

## 2025-08-19 DIAGNOSIS — L81.4 OTHER MELANIN HYPERPIGMENTATION: ICD-10-CM

## 2025-08-19 DIAGNOSIS — D18.0 HEMANGIOMA: ICD-10-CM

## 2025-08-19 PROBLEM — D22.61 MELANOCYTIC NEVI OF RIGHT UPPER LIMB, INCLUDING SHOULDER: Status: ACTIVE | Noted: 2025-08-19

## 2025-08-19 PROBLEM — D22.5 MELANOCYTIC NEVI OF TRUNK: Status: ACTIVE | Noted: 2025-08-19

## 2025-08-19 PROBLEM — D22.62 MELANOCYTIC NEVI OF LEFT UPPER LIMB, INCLUDING SHOULDER: Status: ACTIVE | Noted: 2025-08-19

## 2025-08-19 PROBLEM — D22.39 MELANOCYTIC NEVI OF OTHER PARTS OF FACE: Status: ACTIVE | Noted: 2025-08-19

## 2025-08-19 PROBLEM — D22.71 MELANOCYTIC NEVI OF RIGHT LOWER LIMB, INCLUDING HIP: Status: ACTIVE | Noted: 2025-08-19

## 2025-08-19 PROBLEM — D22.72 MELANOCYTIC NEVI OF LEFT LOWER LIMB, INCLUDING HIP: Status: ACTIVE | Noted: 2025-08-19

## 2025-08-19 PROBLEM — D18.01 HEMANGIOMA OF SKIN AND SUBCUTANEOUS TISSUE: Status: ACTIVE | Noted: 2025-08-19

## 2025-08-19 PROCEDURE — ? COUNSELING

## 2025-08-19 ASSESSMENT — LOCATION SIMPLE DESCRIPTION DERM
LOCATION SIMPLE: CHEST
LOCATION SIMPLE: RIGHT PRETIBIAL REGION
LOCATION SIMPLE: LEFT PRETIBIAL REGION
LOCATION SIMPLE: RIGHT POSTERIOR THIGH
LOCATION SIMPLE: LEFT POSTERIOR UPPER ARM
LOCATION SIMPLE: LEFT FOREHEAD
LOCATION SIMPLE: LEFT POPLITEAL SKIN
LOCATION SIMPLE: LEFT CHEEK
LOCATION SIMPLE: RIGHT UPPER BACK
LOCATION SIMPLE: RIGHT EYEBROW
LOCATION SIMPLE: RIGHT POPLITEAL SKIN
LOCATION SIMPLE: ABDOMEN
LOCATION SIMPLE: RIGHT FOREARM
LOCATION SIMPLE: LEFT POSTERIOR THIGH
LOCATION SIMPLE: UPPER BACK
LOCATION SIMPLE: LEFT FOREARM
LOCATION SIMPLE: RIGHT THIGH
LOCATION SIMPLE: LEFT ELBOW
LOCATION SIMPLE: RIGHT POSTERIOR UPPER ARM

## 2025-08-19 ASSESSMENT — LOCATION DETAILED DESCRIPTION DERM
LOCATION DETAILED: RIGHT DISTAL POSTERIOR UPPER ARM
LOCATION DETAILED: LOWER STERNUM
LOCATION DETAILED: RIGHT ANTERIOR DISTAL THIGH
LOCATION DETAILED: LEFT INFERIOR FOREHEAD
LOCATION DETAILED: RIGHT PROXIMAL POSTERIOR UPPER ARM
LOCATION DETAILED: MIDDLE STERNUM
LOCATION DETAILED: LEFT DISTAL POSTERIOR THIGH
LOCATION DETAILED: RIGHT VENTRAL PROXIMAL FOREARM
LOCATION DETAILED: INFERIOR THORACIC SPINE
LOCATION DETAILED: LEFT VENTRAL LATERAL PROXIMAL FOREARM
LOCATION DETAILED: RIGHT PROXIMAL PRETIBIAL REGION
LOCATION DETAILED: RIGHT LATERAL SUPERIOR CHEST
LOCATION DETAILED: LEFT LATERAL ELBOW
LOCATION DETAILED: RIGHT PROXIMAL DORSAL FOREARM
LOCATION DETAILED: LEFT DISTAL DORSAL FOREARM
LOCATION DETAILED: LEFT SUPERIOR MEDIAL BUCCAL CHEEK
LOCATION DETAILED: RIGHT DISTAL POSTERIOR THIGH
LOCATION DETAILED: RIGHT POPLITEAL SKIN
LOCATION DETAILED: SUBXIPHOID
LOCATION DETAILED: LEFT POPLITEAL SKIN
LOCATION DETAILED: RIGHT SUPERIOR UPPER BACK
LOCATION DETAILED: LEFT DISTAL POSTERIOR UPPER ARM
LOCATION DETAILED: RIGHT CENTRAL EYEBROW
LOCATION DETAILED: LEFT PROXIMAL PRETIBIAL REGION

## 2025-08-19 ASSESSMENT — LOCATION ZONE DERM
LOCATION ZONE: ARM
LOCATION ZONE: TRUNK
LOCATION ZONE: LEG
LOCATION ZONE: FACE